# Patient Record
Sex: MALE | Race: OTHER | NOT HISPANIC OR LATINO
[De-identification: names, ages, dates, MRNs, and addresses within clinical notes are randomized per-mention and may not be internally consistent; named-entity substitution may affect disease eponyms.]

---

## 2019-04-10 ENCOUNTER — APPOINTMENT (OUTPATIENT)
Dept: DERMATOLOGY | Facility: CLINIC | Age: 44
End: 2019-04-10
Payer: COMMERCIAL

## 2019-04-10 VITALS — BODY MASS INDEX: 25.75 KG/M2 | SYSTOLIC BLOOD PRESSURE: 110 MMHG | DIASTOLIC BLOOD PRESSURE: 72 MMHG | WEIGHT: 150 LBS

## 2019-04-10 DIAGNOSIS — L30.0 NUMMULAR DERMATITIS: ICD-10-CM

## 2019-04-10 DIAGNOSIS — D22.9 MELANOCYTIC NEVI, UNSPECIFIED: ICD-10-CM

## 2019-04-10 DIAGNOSIS — L85.3 XEROSIS CUTIS: ICD-10-CM

## 2019-04-10 DIAGNOSIS — Z12.83 ENCOUNTER FOR SCREENING FOR MALIGNANT NEOPLASM OF SKIN: ICD-10-CM

## 2019-04-10 PROCEDURE — 99203 OFFICE O/P NEW LOW 30 MIN: CPT

## 2019-04-10 NOTE — ASSESSMENT
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

## 2019-04-10 NOTE — PHYSICAL EXAM
[FreeTextEntry3] : AAOx3, pleasant, NAD, no visual lymphadenopathy\par hair, scalp, face, nose, eyelids, ears, lips, oropharynx, neck, chest, abdomen, back, right arm, left arm, nails, and hands examined with all normal findings,\par pertinent findings include:\par \par multiple benign nevi and lentigines\par xerosis\par lichenified circular plaques on 25% BSA

## 2019-06-19 ENCOUNTER — APPOINTMENT (OUTPATIENT)
Dept: GASTROENTEROLOGY | Facility: CLINIC | Age: 44
End: 2019-06-19
Payer: COMMERCIAL

## 2019-06-19 VITALS
BODY MASS INDEX: 25.61 KG/M2 | SYSTOLIC BLOOD PRESSURE: 120 MMHG | HEART RATE: 68 BPM | OXYGEN SATURATION: 99 % | HEIGHT: 64 IN | WEIGHT: 150 LBS | TEMPERATURE: 98.8 F | DIASTOLIC BLOOD PRESSURE: 70 MMHG

## 2019-06-19 DIAGNOSIS — Z82.49 FAMILY HISTORY OF ISCHEMIC HEART DISEASE AND OTHER DISEASES OF THE CIRCULATORY SYSTEM: ICD-10-CM

## 2019-06-19 DIAGNOSIS — K59.09 OTHER CONSTIPATION: ICD-10-CM

## 2019-06-19 DIAGNOSIS — D12.6 BENIGN NEOPLASM OF COLON, UNSPECIFIED: ICD-10-CM

## 2019-06-19 DIAGNOSIS — K82.4 CHOLESTEROLOSIS OF GALLBLADDER: ICD-10-CM

## 2019-06-19 DIAGNOSIS — K92.1 MELENA: ICD-10-CM

## 2019-06-19 PROCEDURE — 99204 OFFICE O/P NEW MOD 45 MIN: CPT

## 2019-06-19 RX ORDER — TRIAMCINOLONE ACETONIDE 1 MG/G
0.1 OINTMENT TOPICAL TWICE DAILY
Qty: 1 | Refills: 3 | Status: DISCONTINUED | COMMUNITY
Start: 2019-04-10 | End: 2019-06-19

## 2019-06-19 NOTE — ASSESSMENT
[FreeTextEntry1] : Patient with history of tubular adenomas of the colon. He will be scheduled for colonoscopy.\par \par The patient has no upper gastrointestinal symptoms but had H. pylori gastritis treated several years ago. H. pylori breath test will be ordered.\par \par Patient had an ultrasound that revealed several small benign appearing gallbladder polyps. A followup ultrasound will be ordered\par

## 2019-06-19 NOTE — PHYSICAL EXAM
[Sclera] : the sclera and conjunctiva were normal [General Appearance - In No Acute Distress] : in no acute distress [General Appearance - Alert] : alert [Outer Ear] : the ears and nose were normal in appearance [PERRL With Normal Accommodation] : pupils were equal in size, round, and reactive to light [Extraocular Movements] : extraocular movements were intact [Neck Appearance] : the appearance of the neck was normal [Oropharynx] : the oropharynx was normal [Neck Cervical Mass (___cm)] : no neck mass was observed [Jugular Venous Distention Increased] : there was no jugular-venous distention [Thyroid Nodule] : there were no palpable thyroid nodules [Thyroid Diffuse Enlargement] : the thyroid was not enlarged [Heart Rate And Rhythm] : heart rate was normal and rhythm regular [Auscultation Breath Sounds / Voice Sounds] : lungs were clear to auscultation bilaterally [Murmurs] : no murmurs [Heart Sounds Gallop] : no gallops [Heart Sounds] : normal S1 and S2 [Heart Sounds Pericardial Friction Rub] : no pericardial rub [Bowel Sounds] : normal bowel sounds [Abdomen Soft] : soft [] : no hepato-splenomegaly [Abdomen Tenderness] : non-tender [No Spinal Tenderness] : no spinal tenderness [No CVA Tenderness] : no ~M costovertebral angle tenderness [Abdomen Mass (___ Cm)] : no abdominal mass palpated [Nail Clubbing] : no clubbing  or cyanosis of the fingernails [Abnormal Walk] : normal gait [Musculoskeletal - Swelling] : no joint swelling seen [Oriented To Time, Place, And Person] : oriented to person, place, and time [Motor Tone] : muscle strength and tone were normal [Impaired Insight] : insight and judgment were intact [Affect] : the affect was normal

## 2019-06-19 NOTE — HISTORY OF PRESENT ILLNESS
[FreeTextEntry1] : Patient is a 43-year-old gentleman presents for a screening colonoscopy. He had a colonoscopy 3 years ago and had several tubular adenomas were removed. He does have some constipation and moves his bowels every 2-3 days. When the stool was hard he does notice some bright red blood on the stool, on the paper, and in the bowl.\par The patient has no upper gastrointestinal symptoms such as heartburn. He was treated for H. pylori several years ago.\par Patient also has a history of benign appearing gallbladder polyps. His list sonogram was several years ago there

## 2019-07-18 ENCOUNTER — APPOINTMENT (OUTPATIENT)
Dept: GASTROENTEROLOGY | Facility: AMBULATORY MEDICAL SERVICES | Age: 44
End: 2019-07-18
Payer: COMMERCIAL

## 2019-07-18 PROCEDURE — 45378 DIAGNOSTIC COLONOSCOPY: CPT

## 2019-08-09 ENCOUNTER — FORM ENCOUNTER (OUTPATIENT)
Age: 44
End: 2019-08-09

## 2019-08-10 ENCOUNTER — APPOINTMENT (OUTPATIENT)
Dept: ULTRASOUND IMAGING | Facility: CLINIC | Age: 44
End: 2019-08-10
Payer: COMMERCIAL

## 2019-08-10 ENCOUNTER — OUTPATIENT (OUTPATIENT)
Dept: OUTPATIENT SERVICES | Facility: HOSPITAL | Age: 44
LOS: 1 days | End: 2019-08-10

## 2019-08-10 ENCOUNTER — OUTPATIENT (OUTPATIENT)
Dept: OUTPATIENT SERVICES | Facility: HOSPITAL | Age: 44
LOS: 1 days | End: 2019-08-10
Payer: COMMERCIAL

## 2019-08-10 DIAGNOSIS — K82.4 CHOLESTEROLOSIS OF GALLBLADDER: ICD-10-CM

## 2019-08-10 DIAGNOSIS — Z00.8 ENCOUNTER FOR OTHER GENERAL EXAMINATION: ICD-10-CM

## 2019-08-10 PROCEDURE — 76700 US EXAM ABDOM COMPLETE: CPT

## 2019-08-10 PROCEDURE — 76700 US EXAM ABDOM COMPLETE: CPT | Mod: 26

## 2019-12-24 ENCOUNTER — NON-APPOINTMENT (OUTPATIENT)
Age: 44
End: 2019-12-24

## 2019-12-24 ENCOUNTER — APPOINTMENT (OUTPATIENT)
Dept: OPHTHALMOLOGY | Facility: CLINIC | Age: 44
End: 2019-12-24
Payer: COMMERCIAL

## 2019-12-24 PROCEDURE — 92004 COMPRE OPH EXAM NEW PT 1/>: CPT

## 2020-04-25 ENCOUNTER — MESSAGE (OUTPATIENT)
Age: 45
End: 2020-04-25

## 2020-05-03 LAB
SARS-COV-2 IGG SERPL IA-ACNC: 0 INDEX
SARS-COV-2 IGG SERPL QL IA: NEGATIVE

## 2022-07-18 ENCOUNTER — NON-APPOINTMENT (OUTPATIENT)
Age: 47
End: 2022-07-18

## 2022-07-19 ENCOUNTER — OUTPATIENT (OUTPATIENT)
Dept: OUTPATIENT SERVICES | Facility: HOSPITAL | Age: 47
LOS: 1 days | End: 2022-07-19
Payer: COMMERCIAL

## 2022-07-19 ENCOUNTER — APPOINTMENT (OUTPATIENT)
Dept: CT IMAGING | Facility: HOSPITAL | Age: 47
End: 2022-07-19

## 2022-07-19 DIAGNOSIS — R10.84 GENERALIZED ABDOMINAL PAIN: ICD-10-CM

## 2022-07-19 PROCEDURE — 74160 CT ABDOMEN W/CONTRAST: CPT | Mod: 26

## 2022-07-19 PROCEDURE — 74160 CT ABDOMEN W/CONTRAST: CPT

## 2022-08-01 ENCOUNTER — NON-APPOINTMENT (OUTPATIENT)
Age: 47
End: 2022-08-01

## 2022-08-01 ENCOUNTER — APPOINTMENT (OUTPATIENT)
Dept: CARDIOLOGY | Facility: CLINIC | Age: 47
End: 2022-08-01

## 2022-08-01 VITALS
HEIGHT: 64 IN | BODY MASS INDEX: 26.4 KG/M2 | SYSTOLIC BLOOD PRESSURE: 118 MMHG | WEIGHT: 154.6 LBS | DIASTOLIC BLOOD PRESSURE: 70 MMHG | HEART RATE: 74 BPM | OXYGEN SATURATION: 97 %

## 2022-08-01 VITALS — DIASTOLIC BLOOD PRESSURE: 70 MMHG | SYSTOLIC BLOOD PRESSURE: 100 MMHG

## 2022-08-01 PROCEDURE — 93000 ELECTROCARDIOGRAM COMPLETE: CPT

## 2022-08-01 PROCEDURE — 99204 OFFICE O/P NEW MOD 45 MIN: CPT

## 2022-08-01 RX ORDER — DOCUSATE SODIUM 100 MG/1
100 CAPSULE ORAL
Qty: 60 | Refills: 1 | Status: DISCONTINUED | OUTPATIENT
Start: 2019-06-19 | End: 2022-08-01

## 2022-08-01 RX ORDER — SODIUM SULFATE, POTASSIUM SULFATE, MAGNESIUM SULFATE 17.5; 3.13; 1.6 G/ML; G/ML; G/ML
17.5-3.13-1.6 SOLUTION, CONCENTRATE ORAL
Qty: 1 | Refills: 0 | Status: DISCONTINUED | COMMUNITY
Start: 2019-06-19 | End: 2022-08-01

## 2022-08-01 RX ORDER — WHEAT DEXTRIN 3 G/4 G
POWDER (GRAM) ORAL
Qty: 1 | Refills: 0 | Status: DISCONTINUED | OUTPATIENT
Start: 2019-06-19 | End: 2022-08-01

## 2022-08-01 NOTE — HISTORY OF PRESENT ILLNESS
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  Since that time she reports being short of breath after running for a minute with some associated epigastric/lower sternal chest pressure that resolves over the next 5 to 10 minutes.  No other associated symptoms are reported.\par He does not have any chest pain or shortness of breath at rest.\par He has no history of coronary artery disease, diabetes mellitus, stroke, congestive heart failure or renal insufficiency.\par He does have some occasional orthostatic symptoms and reports that he quit smoking 10 years ago, but only smoked for about 10 years.\par He has elevated triglycerides in the past but normal LDL.\tony Has a history of fatty liver and has been treated in the past for expiratory infection.  He is followed by GI for colon polyp as well.\par Family history is notable for his mother's cousin who had premature coronary disease in his 40s and sudden death.\par

## 2022-08-01 NOTE — DISCUSSION/SUMMARY
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 who has a history of high triglycerides and is a former smoker.\par His ECG shows normal sinus rhythm with early repolarization, this is likely a normal variant, though I have no previous ECG with which to compare to.\par We reviewed the possibilities for symptoms including coronary artery disease or deconditioning with valvular disease or pericardial effusion much less likely.\par I have scheduled an echocardiogram to exclude any structural heart disease for his exertional dyspnea and, if no abnormalities are seen an exercise stress test would be appropriate to exclude serious ischemia, known this is unlikely.\par I referred him to an internist for general medical therapy.

## 2022-09-07 ENCOUNTER — APPOINTMENT (OUTPATIENT)
Dept: CARDIOLOGY | Facility: CLINIC | Age: 47
End: 2022-09-07

## 2022-09-07 DIAGNOSIS — R07.9 CHEST PAIN, UNSPECIFIED: ICD-10-CM

## 2022-09-07 PROCEDURE — 93015 CV STRESS TEST SUPVJ I&R: CPT

## 2022-09-07 PROCEDURE — 93306 TTE W/DOPPLER COMPLETE: CPT

## 2022-11-21 ENCOUNTER — NON-APPOINTMENT (OUTPATIENT)
Age: 47
End: 2022-11-21

## 2022-11-21 ENCOUNTER — APPOINTMENT (OUTPATIENT)
Dept: INTERNAL MEDICINE | Facility: CLINIC | Age: 47
End: 2022-11-21

## 2022-11-21 VITALS — BODY MASS INDEX: 25.95 KG/M2 | HEIGHT: 64 IN | WEIGHT: 152 LBS

## 2022-11-21 VITALS — SYSTOLIC BLOOD PRESSURE: 122 MMHG | HEART RATE: 72 BPM | DIASTOLIC BLOOD PRESSURE: 76 MMHG | RESPIRATION RATE: 14 BRPM

## 2022-11-21 DIAGNOSIS — B96.81 GASTRITIS, UNSPECIFIED, W/OUT BLEEDING: ICD-10-CM

## 2022-11-21 DIAGNOSIS — Z83.79 FAMILY HISTORY OF OTHER DISEASES OF THE DIGESTIVE SYSTEM: ICD-10-CM

## 2022-11-21 DIAGNOSIS — K29.70 GASTRITIS, UNSPECIFIED, W/OUT BLEEDING: ICD-10-CM

## 2022-11-21 DIAGNOSIS — Z87.891 PERSONAL HISTORY OF NICOTINE DEPENDENCE: ICD-10-CM

## 2022-11-21 DIAGNOSIS — E78.1 PURE HYPERGLYCERIDEMIA: ICD-10-CM

## 2022-11-21 DIAGNOSIS — R06.00 DYSPNEA, UNSPECIFIED: ICD-10-CM

## 2022-11-21 DIAGNOSIS — Z83.438 FAMILY HISTORY OF OTHER DISORDER OF LIPOPROTEIN METABOLISM AND OTHER LIPIDEMIA: ICD-10-CM

## 2022-11-21 PROCEDURE — 99203 OFFICE O/P NEW LOW 30 MIN: CPT | Mod: 25

## 2022-11-21 PROCEDURE — 93000 ELECTROCARDIOGRAM COMPLETE: CPT | Mod: 59

## 2022-11-21 PROCEDURE — G0444 DEPRESSION SCREEN ANNUAL: CPT | Mod: 59

## 2022-11-21 PROCEDURE — 99386 PREV VISIT NEW AGE 40-64: CPT | Mod: 25

## 2022-11-21 PROCEDURE — 36415 COLL VENOUS BLD VENIPUNCTURE: CPT

## 2022-11-27 NOTE — HISTORY OF PRESENT ILLNESS
[FreeTextEntry1] : MERCY WELLINGTON is a 47 year old male  presents for an annual physical. Patient is also here for f/u of chronic medical conditions, which have been stable on medications. These include fatty liver. [de-identified] : Here to establish care

## 2022-11-27 NOTE — ASSESSMENT
[FreeTextEntry1] : Blood work was drawn and sent to the lab today. The patient has been instructed to call the office next week to discuss today's lab work.\par \par Abdominal US to further evaluate fatty liver\par Consider GI evaluation and fibroscan\par \par Routine health counselling provided\par \par \par Annual CPE\par \par \par

## 2022-11-27 NOTE — HEALTH RISK ASSESSMENT
[Very Good] : ~his/her~  mood as very good [Former] : Former [No] : No [0] : 2) Feeling down, depressed, or hopeless: Not at all (0) [PHQ-2 Negative - No further assessment needed] : PHQ-2 Negative - No further assessment needed [[id number]] : 0

## 2022-12-05 LAB
25(OH)D3 SERPL-MCNC: 16.8 NG/ML
ALBUMIN SERPL ELPH-MCNC: 4.9 G/DL
ALP BLD-CCNC: 83 U/L
ALT SERPL-CCNC: 24 U/L
ANION GAP SERPL CALC-SCNC: 13 MMOL/L
APPEARANCE: CLEAR
AST SERPL-CCNC: 24 U/L
BASOPHILS # BLD AUTO: 0.06 K/UL
BASOPHILS NFR BLD AUTO: 0.5 %
BILIRUB SERPL-MCNC: 0.4 MG/DL
BILIRUBIN URINE: NEGATIVE
BLOOD URINE: NEGATIVE
BUN SERPL-MCNC: 18 MG/DL
CALCIUM SERPL-MCNC: 9.9 MG/DL
CHLORIDE SERPL-SCNC: 100 MMOL/L
CHOLEST SERPL-MCNC: 226 MG/DL
CO2 SERPL-SCNC: 25 MMOL/L
COLOR: NORMAL
CREAT SERPL-MCNC: 0.97 MG/DL
EGFR: 96 ML/MIN/1.73M2
EOSINOPHIL # BLD AUTO: 0.15 K/UL
EOSINOPHIL NFR BLD AUTO: 1.3 %
FOLATE SERPL-MCNC: 8.7 NG/ML
GLUCOSE QUALITATIVE U: NEGATIVE
GLUCOSE SERPL-MCNC: 89 MG/DL
HCT VFR BLD CALC: 43.8 %
HDLC SERPL-MCNC: 46 MG/DL
HGB BLD-MCNC: 14.5 G/DL
IMM GRANULOCYTES NFR BLD AUTO: 0.5 %
KETONES URINE: NEGATIVE
LDLC SERPL CALC-MCNC: 108 MG/DL
LEUKOCYTE ESTERASE URINE: NEGATIVE
LYMPHOCYTES # BLD AUTO: 1.83 K/UL
LYMPHOCYTES NFR BLD AUTO: 15.8 %
MAGNESIUM SERPL-MCNC: 2.1 MG/DL
MAN DIFF?: NORMAL
MCHC RBC-ENTMCNC: 31.8 PG
MCHC RBC-ENTMCNC: 33.1 GM/DL
MCV RBC AUTO: 96.1 FL
MONOCYTES # BLD AUTO: 0.71 K/UL
MONOCYTES NFR BLD AUTO: 6.1 %
NEUTROPHILS # BLD AUTO: 8.75 K/UL
NEUTROPHILS NFR BLD AUTO: 75.8 %
NITRITE URINE: NEGATIVE
NONHDLC SERPL-MCNC: 181 MG/DL
PH URINE: 7
PLATELET # BLD AUTO: 382 K/UL
POTASSIUM SERPL-SCNC: 4.3 MMOL/L
PROT SERPL-MCNC: 7.7 G/DL
PROTEIN URINE: NORMAL
RBC # BLD: 4.56 M/UL
RBC # FLD: 15 %
SODIUM SERPL-SCNC: 138 MMOL/L
SPECIFIC GRAVITY URINE: 1.02
T4 FREE SERPL-MCNC: 1.2 NG/DL
T4 SERPL-MCNC: 7.4 UG/DL
TRIGL SERPL-MCNC: 361 MG/DL
TSH SERPL-ACNC: 1.34 UIU/ML
URATE SERPL-MCNC: 5.9 MG/DL
UROBILINOGEN URINE: NORMAL
VIT B12 SERPL-MCNC: 394 PG/ML
WBC # FLD AUTO: 11.56 K/UL

## 2023-01-03 ENCOUNTER — APPOINTMENT (OUTPATIENT)
Dept: CARDIOLOGY | Facility: CLINIC | Age: 48
End: 2023-01-03

## 2023-08-22 ENCOUNTER — NON-APPOINTMENT (OUTPATIENT)
Age: 48
End: 2023-08-22

## 2023-08-28 ENCOUNTER — NON-APPOINTMENT (OUTPATIENT)
Age: 48
End: 2023-08-28

## 2023-11-09 NOTE — HISTORY OF PRESENT ILLNESS
unable to perform [de-identified] : patient here with nummular eczema. has 25% BSA with lichenified plaques. has been using triamcinolone with good response. interested in exploring other treatments.\par \par also would like skin check [FreeTextEntry1] : eczema, skin check

## 2023-11-29 ENCOUNTER — APPOINTMENT (OUTPATIENT)
Dept: COLORECTAL SURGERY | Facility: CLINIC | Age: 48
End: 2023-11-29
Payer: COMMERCIAL

## 2023-11-29 VITALS
HEIGHT: 64 IN | WEIGHT: 152 LBS | DIASTOLIC BLOOD PRESSURE: 76 MMHG | BODY MASS INDEX: 25.95 KG/M2 | SYSTOLIC BLOOD PRESSURE: 128 MMHG | TEMPERATURE: 97.8 F | HEART RATE: 62 BPM | RESPIRATION RATE: 12 BRPM

## 2023-11-29 DIAGNOSIS — K62.5 HEMORRHAGE OF ANUS AND RECTUM: ICD-10-CM

## 2023-11-29 DIAGNOSIS — K60.2 ANAL FISSURE, UNSPECIFIED: ICD-10-CM

## 2023-11-29 DIAGNOSIS — Z86.39 PERSONAL HISTORY OF OTHER ENDOCRINE, NUTRITIONAL AND METABOLIC DISEASE: ICD-10-CM

## 2023-11-29 PROCEDURE — 45300 PROCTOSIGMOIDOSCOPY DX: CPT

## 2023-11-29 PROCEDURE — 99202 OFFICE O/P NEW SF 15 MIN: CPT | Mod: 25

## 2023-11-29 RX ORDER — ROSUVASTATIN CALCIUM 10 MG/1
10 TABLET, FILM COATED ORAL
Refills: 0 | Status: ACTIVE | COMMUNITY

## 2023-11-29 RX ORDER — HYDROCORTISONE ACETATE 25 MG/1
25 SUPPOSITORY RECTAL
Qty: 1 | Refills: 2 | Status: ACTIVE | COMMUNITY
Start: 2023-11-29 | End: 1900-01-01

## 2023-12-03 ENCOUNTER — EMERGENCY (EMERGENCY)
Facility: HOSPITAL | Age: 48
LOS: 1 days | Discharge: HOME | End: 2023-12-03
Attending: EMERGENCY MEDICINE
Payer: COMMERCIAL

## 2023-12-03 VITALS
SYSTOLIC BLOOD PRESSURE: 132 MMHG | TEMPERATURE: 98 F | HEART RATE: 77 BPM | WEIGHT: 151.9 LBS | DIASTOLIC BLOOD PRESSURE: 84 MMHG | HEIGHT: 64 IN | RESPIRATION RATE: 19 BRPM | OXYGEN SATURATION: 99 %

## 2023-12-03 LAB
ALBUMIN SERPL ELPH-MCNC: 4.6 G/DL — SIGNIFICANT CHANGE UP (ref 3.3–5)
ALBUMIN SERPL ELPH-MCNC: 4.6 G/DL — SIGNIFICANT CHANGE UP (ref 3.3–5)
ALP SERPL-CCNC: 98 U/L — SIGNIFICANT CHANGE UP (ref 40–120)
ALP SERPL-CCNC: 98 U/L — SIGNIFICANT CHANGE UP (ref 40–120)
ALT FLD-CCNC: 39 U/L — SIGNIFICANT CHANGE UP (ref 10–45)
ALT FLD-CCNC: 39 U/L — SIGNIFICANT CHANGE UP (ref 10–45)
ANION GAP SERPL CALC-SCNC: 10 MMOL/L — SIGNIFICANT CHANGE UP (ref 5–17)
ANION GAP SERPL CALC-SCNC: 10 MMOL/L — SIGNIFICANT CHANGE UP (ref 5–17)
AST SERPL-CCNC: 31 U/L — SIGNIFICANT CHANGE UP (ref 10–40)
AST SERPL-CCNC: 31 U/L — SIGNIFICANT CHANGE UP (ref 10–40)
BASOPHILS # BLD AUTO: 0.08 K/UL — SIGNIFICANT CHANGE UP (ref 0–0.2)
BASOPHILS # BLD AUTO: 0.08 K/UL — SIGNIFICANT CHANGE UP (ref 0–0.2)
BASOPHILS NFR BLD AUTO: 0.5 % — SIGNIFICANT CHANGE UP (ref 0–2)
BASOPHILS NFR BLD AUTO: 0.5 % — SIGNIFICANT CHANGE UP (ref 0–2)
BILIRUB SERPL-MCNC: 0.3 MG/DL — SIGNIFICANT CHANGE UP (ref 0.2–1.2)
BILIRUB SERPL-MCNC: 0.3 MG/DL — SIGNIFICANT CHANGE UP (ref 0.2–1.2)
BUN SERPL-MCNC: 15 MG/DL — SIGNIFICANT CHANGE UP (ref 7–23)
BUN SERPL-MCNC: 15 MG/DL — SIGNIFICANT CHANGE UP (ref 7–23)
CALCIUM SERPL-MCNC: 9.4 MG/DL — SIGNIFICANT CHANGE UP (ref 8.4–10.5)
CALCIUM SERPL-MCNC: 9.4 MG/DL — SIGNIFICANT CHANGE UP (ref 8.4–10.5)
CHLORIDE SERPL-SCNC: 105 MMOL/L — SIGNIFICANT CHANGE UP (ref 96–108)
CHLORIDE SERPL-SCNC: 105 MMOL/L — SIGNIFICANT CHANGE UP (ref 96–108)
CO2 SERPL-SCNC: 24 MMOL/L — SIGNIFICANT CHANGE UP (ref 22–31)
CO2 SERPL-SCNC: 24 MMOL/L — SIGNIFICANT CHANGE UP (ref 22–31)
CREAT SERPL-MCNC: 0.99 MG/DL — SIGNIFICANT CHANGE UP (ref 0.5–1.3)
CREAT SERPL-MCNC: 0.99 MG/DL — SIGNIFICANT CHANGE UP (ref 0.5–1.3)
EGFR: 93 ML/MIN/1.73M2 — SIGNIFICANT CHANGE UP
EGFR: 93 ML/MIN/1.73M2 — SIGNIFICANT CHANGE UP
EOSINOPHIL # BLD AUTO: 0.28 K/UL — SIGNIFICANT CHANGE UP (ref 0–0.5)
EOSINOPHIL # BLD AUTO: 0.28 K/UL — SIGNIFICANT CHANGE UP (ref 0–0.5)
EOSINOPHIL NFR BLD AUTO: 1.8 % — SIGNIFICANT CHANGE UP (ref 0–6)
EOSINOPHIL NFR BLD AUTO: 1.8 % — SIGNIFICANT CHANGE UP (ref 0–6)
FLUAV AG NPH QL: SIGNIFICANT CHANGE UP
FLUAV AG NPH QL: SIGNIFICANT CHANGE UP
FLUBV AG NPH QL: SIGNIFICANT CHANGE UP
FLUBV AG NPH QL: SIGNIFICANT CHANGE UP
GLUCOSE SERPL-MCNC: 97 MG/DL — SIGNIFICANT CHANGE UP (ref 70–99)
GLUCOSE SERPL-MCNC: 97 MG/DL — SIGNIFICANT CHANGE UP (ref 70–99)
HCT VFR BLD CALC: 41.6 % — SIGNIFICANT CHANGE UP (ref 39–50)
HCT VFR BLD CALC: 41.6 % — SIGNIFICANT CHANGE UP (ref 39–50)
HGB BLD-MCNC: 14.1 G/DL — SIGNIFICANT CHANGE UP (ref 13–17)
HGB BLD-MCNC: 14.1 G/DL — SIGNIFICANT CHANGE UP (ref 13–17)
IMM GRANULOCYTES NFR BLD AUTO: 0.6 % — SIGNIFICANT CHANGE UP (ref 0–0.9)
IMM GRANULOCYTES NFR BLD AUTO: 0.6 % — SIGNIFICANT CHANGE UP (ref 0–0.9)
LYMPHOCYTES # BLD AUTO: 1.75 K/UL — SIGNIFICANT CHANGE UP (ref 1–3.3)
LYMPHOCYTES # BLD AUTO: 1.75 K/UL — SIGNIFICANT CHANGE UP (ref 1–3.3)
LYMPHOCYTES # BLD AUTO: 11.1 % — LOW (ref 13–44)
LYMPHOCYTES # BLD AUTO: 11.1 % — LOW (ref 13–44)
MCHC RBC-ENTMCNC: 31.9 PG — SIGNIFICANT CHANGE UP (ref 27–34)
MCHC RBC-ENTMCNC: 31.9 PG — SIGNIFICANT CHANGE UP (ref 27–34)
MCHC RBC-ENTMCNC: 33.9 GM/DL — SIGNIFICANT CHANGE UP (ref 32–36)
MCHC RBC-ENTMCNC: 33.9 GM/DL — SIGNIFICANT CHANGE UP (ref 32–36)
MCV RBC AUTO: 94.1 FL — SIGNIFICANT CHANGE UP (ref 80–100)
MCV RBC AUTO: 94.1 FL — SIGNIFICANT CHANGE UP (ref 80–100)
MONOCYTES # BLD AUTO: 0.73 K/UL — SIGNIFICANT CHANGE UP (ref 0–0.9)
MONOCYTES # BLD AUTO: 0.73 K/UL — SIGNIFICANT CHANGE UP (ref 0–0.9)
MONOCYTES NFR BLD AUTO: 4.6 % — SIGNIFICANT CHANGE UP (ref 2–14)
MONOCYTES NFR BLD AUTO: 4.6 % — SIGNIFICANT CHANGE UP (ref 2–14)
NEUTROPHILS # BLD AUTO: 12.76 K/UL — HIGH (ref 1.8–7.4)
NEUTROPHILS # BLD AUTO: 12.76 K/UL — HIGH (ref 1.8–7.4)
NEUTROPHILS NFR BLD AUTO: 81.4 % — HIGH (ref 43–77)
NEUTROPHILS NFR BLD AUTO: 81.4 % — HIGH (ref 43–77)
NRBC # BLD: 0 /100 WBCS — SIGNIFICANT CHANGE UP (ref 0–0)
NRBC # BLD: 0 /100 WBCS — SIGNIFICANT CHANGE UP (ref 0–0)
PLATELET # BLD AUTO: 383 K/UL — SIGNIFICANT CHANGE UP (ref 150–400)
PLATELET # BLD AUTO: 383 K/UL — SIGNIFICANT CHANGE UP (ref 150–400)
POTASSIUM SERPL-MCNC: 4.6 MMOL/L — SIGNIFICANT CHANGE UP (ref 3.5–5.3)
POTASSIUM SERPL-MCNC: 4.6 MMOL/L — SIGNIFICANT CHANGE UP (ref 3.5–5.3)
POTASSIUM SERPL-SCNC: 4.6 MMOL/L — SIGNIFICANT CHANGE UP (ref 3.5–5.3)
POTASSIUM SERPL-SCNC: 4.6 MMOL/L — SIGNIFICANT CHANGE UP (ref 3.5–5.3)
PROT SERPL-MCNC: 7.6 G/DL — SIGNIFICANT CHANGE UP (ref 6–8.3)
PROT SERPL-MCNC: 7.6 G/DL — SIGNIFICANT CHANGE UP (ref 6–8.3)
RBC # BLD: 4.42 M/UL — SIGNIFICANT CHANGE UP (ref 4.2–5.8)
RBC # BLD: 4.42 M/UL — SIGNIFICANT CHANGE UP (ref 4.2–5.8)
RBC # FLD: 14.8 % — HIGH (ref 10.3–14.5)
RBC # FLD: 14.8 % — HIGH (ref 10.3–14.5)
RSV RNA NPH QL NAA+NON-PROBE: SIGNIFICANT CHANGE UP
RSV RNA NPH QL NAA+NON-PROBE: SIGNIFICANT CHANGE UP
S PYO AG SPEC QL IA: NEGATIVE — SIGNIFICANT CHANGE UP
S PYO AG SPEC QL IA: NEGATIVE — SIGNIFICANT CHANGE UP
SARS-COV-2 RNA SPEC QL NAA+PROBE: SIGNIFICANT CHANGE UP
SARS-COV-2 RNA SPEC QL NAA+PROBE: SIGNIFICANT CHANGE UP
SODIUM SERPL-SCNC: 139 MMOL/L — SIGNIFICANT CHANGE UP (ref 135–145)
SODIUM SERPL-SCNC: 139 MMOL/L — SIGNIFICANT CHANGE UP (ref 135–145)
WBC # BLD: 15.7 K/UL — HIGH (ref 3.8–10.5)
WBC # BLD: 15.7 K/UL — HIGH (ref 3.8–10.5)
WBC # FLD AUTO: 15.7 K/UL — HIGH (ref 3.8–10.5)
WBC # FLD AUTO: 15.7 K/UL — HIGH (ref 3.8–10.5)

## 2023-12-03 PROCEDURE — 99285 EMERGENCY DEPT VISIT HI MDM: CPT

## 2023-12-03 PROCEDURE — 70491 CT SOFT TISSUE NECK W/DYE: CPT | Mod: 26,MA

## 2023-12-03 RX ORDER — KETOROLAC TROMETHAMINE 30 MG/ML
15 SYRINGE (ML) INJECTION ONCE
Refills: 0 | Status: DISCONTINUED | OUTPATIENT
Start: 2023-12-03 | End: 2023-12-03

## 2023-12-03 RX ORDER — AMPICILLIN SODIUM AND SULBACTAM SODIUM 250; 125 MG/ML; MG/ML
3 INJECTION, POWDER, FOR SUSPENSION INTRAMUSCULAR; INTRAVENOUS ONCE
Refills: 0 | Status: COMPLETED | OUTPATIENT
Start: 2023-12-03 | End: 2023-12-03

## 2023-12-03 RX ORDER — DEXAMETHASONE 0.5 MG/5ML
10 ELIXIR ORAL ONCE
Refills: 0 | Status: COMPLETED | OUTPATIENT
Start: 2023-12-03 | End: 2023-12-03

## 2023-12-03 RX ORDER — SODIUM CHLORIDE 9 MG/ML
1000 INJECTION, SOLUTION INTRAVENOUS ONCE
Refills: 0 | Status: COMPLETED | OUTPATIENT
Start: 2023-12-03 | End: 2023-12-03

## 2023-12-03 RX ADMIN — Medication 15 MILLIGRAM(S): at 21:29

## 2023-12-03 RX ADMIN — Medication 15 MILLIGRAM(S): at 22:00

## 2023-12-03 RX ADMIN — AMPICILLIN SODIUM AND SULBACTAM SODIUM 200 GRAM(S): 250; 125 INJECTION, POWDER, FOR SUSPENSION INTRAMUSCULAR; INTRAVENOUS at 23:32

## 2023-12-03 RX ADMIN — Medication 10 MILLIGRAM(S): at 21:36

## 2023-12-03 NOTE — ED PROVIDER NOTE - NSFOLLOWUPCLINICS_GEN_ALL_ED_FT
[facility] - ENT  Otolaryngology (ENT)  [address] [place] [address]  Phone: [phone]  Fax:   Follow Up Time: Routine     [facility] - ENT  Otolaryngology (ENT)  [address] [place] [address]  Phone: [phone]  Fax:   Follow Up Time: Routine

## 2023-12-03 NOTE — ED PROVIDER NOTE - OBJECTIVE STATEMENT
47yo M w/ no pmh presenting with throat pain. Pt had URI 4w ago, with cough, sore throat. All symptoms improved however sore throat persisted. Has R throat pain, worse with swallowing. Daughter has URI currently. No fever, cp, sob, abd pain, n/v. Was on amoxicillin finished course recently. 47yo M w/ no pmh presenting with throat pain. Pt had URI 4w ago, with cough, sore throat. All symptoms improved however sore throat persisted. Has R throat pain, worse with swallowing. Worsened yesterday. Daughter has URI currently which started yesterday. No fever, cp, sob, abd pain, n/v. Took some leftover amoxicillin Thurs, Fri, today, total 6 doses.

## 2023-12-03 NOTE — ED PROVIDER NOTE - PROGRESS NOTE DETAILS
Attending Dr. Pardo: Patient signed out to me pending CT to r/o PTA. CT without acute pathology. Patient reassessed, pain is significantly improved. Discussed results including elevated WBC. Patient would prefer DC on oral antibiotics but would consider CDU if available. Spoke with CDU PA, currently full but possible virtual CDU. Will follow up. Attending Dr. Pardo: No beds available in CDU. Patient requesting discharge and will follow up with his PMD. Augmentin sent to pharmacy. Instructed on proper use (complete course, not just used for pain). Patient tolerated PO intake.

## 2023-12-03 NOTE — ED PROVIDER NOTE - PHYSICAL EXAMINATION
General appearance: NAD, conversant, afebrile    Eyes: anicteric sclerae, KAREN, EOMI   HENT: oropharynx clear, MMM and no ulcerations, no pharyngeal erythema or exudate; no trismus   Neck: no swelling or ttp. Trachea midline; Full range of motion, supple   Pulm: CTA bl, normal respiratory effort and no intercostal retractions, normal work of breathing   CV: RRR, No murmurs, rubs, or gallops.    Abdomen: Soft, non-tender, non-distended; no guarding or rebound   Extremities: No peripheral edema or extremity lymphadenopathy. 5/5 strength in all four extremities.   Skin: Dry, normal temperature, turgor and texture; no rash, ulcers or subcutaneous nodules   Psych: Appropriate affect, cooperative; alert and oriented to person, place and time General appearance: NAD, conversant, afebrile    Eyes: anicteric sclerae, KAREN, EOMI   HENT: mild R TM clear effusion, no pus. oropharynx clear, MMM and no ulcerations, no pharyngeal erythema or exudate; no trismus   Neck: no swelling or ttp. Trachea midline; Full range of motion, supple   Pulm: CTA bl, normal respiratory effort and no intercostal retractions, normal work of breathing   CV: RRR, No murmurs, rubs, or gallops.    Abdomen: Soft, non-tender, non-distended; no guarding or rebound   Extremities: No peripheral edema or extremity lymphadenopathy. 5/5 strength in all four extremities.   Skin: Dry, normal temperature, turgor and texture; no rash, ulcers or subcutaneous nodules   Psych: Appropriate affect, cooperative; alert and oriented to person, place and time

## 2023-12-03 NOTE — ED PROVIDER NOTE - NSFOLLOWUPINSTRUCTIONS_ED_ALL_ED_FT
1. You presented to the emergency department for: SORE THROAT.     Take the antibiotics as prescribed.     2. Your evaluation in the emergency department included a physician evaluation. Your work-up did not reveal any findings indicating the need for admission to the hospital or any emergent interventions at this time.     3. It is recommended that you follow-up with your primary care doctor.    If needed, to arrange an appointment with a primary care provider please call: [phone]    4. Please continue taking your regular medications as prescribed.     For pain you may take 400-600 mg IBUPROFEN or 500-1000mg ACETAMINOPHEN every 6-8 hours - as needed.  This is an over-the-counter medication - please read the instructions for use and warnings on the label. If you have any questions regarding its use, you may refer them to your local pharmacist.    5. PLEASE RETURN TO THE EMERGENCY DEPARTMENT IMMEDIATELY IF you develop any fevers not responding to over the counter medications, uncontrollable nausea and vomiting, an inability to tolerate eating and drinking, difficulty breathing, chest pain, a severe increase in your symptoms or pain, or any other new symptoms that concern you. 1. You presented to the emergency department for: SORE THROAT.     Take the antibiotics as prescribed.     2. Your evaluation in the emergency department included a physician evaluation. Your work-up did not reveal any findings indicating the need for admission to the hospital or any emergent interventions at this time.     3. It is recommended that you follow-up with your primary care doctor.    If needed, to arrange an appointment with a primary care provider please call: [phone]    4. Please continue taking your regular medications as prescribed.     For pain you may take 400-600 mg IBUPROFEN or 500-1000mg ACETAMINOPHEN every 6-8 hours - as needed.  This is an over-the-counter medication - please read the instructions for use and warnings on the label. If you have any questions regarding its use, you may refer them to your local pharmacist.    5. PLEASE RETURN TO THE EMERGENCY DEPARTMENT IMMEDIATELY IF you develop any fevers not responding to over the counter medications, uncontrollable nausea and vomiting, an inability to tolerate eating and drinking, difficulty breathing, chest pain, a severe increase in your symptoms or pain, or any other new symptoms that concern you.

## 2023-12-03 NOTE — ED ADULT TRIAGE NOTE - CHIEF COMPLAINT QUOTE
R side throat pain x weeks. Worsened today, when swallowing.   Took leftover augmentin for 3 days. Last dose this afternoon.

## 2023-12-03 NOTE — ED PROVIDER NOTE - PATIENT PORTAL LINK FT
You can access the FollowMyHealth Patient Portal offered by [facility] by registering at the following website: [email or web address]. By joining [employer]’s FollowMyHealth portal, you will also be able to view your health information using other applications (apps) compatible with our system. You can access the FollowMyHealth Patient Portal offered by [facility] by registering at the following website: [email or web address]. By joining [employer]’s FollowMyHealth portal, you will also be able to view your health information using other applications (apps) compatible with our system.

## 2023-12-03 NOTE — ED PROVIDER NOTE - CLINICAL SUMMARY MEDICAL DECISION MAKING FREE TEXT BOX
47yo M w/ no pmh presenting with throat pain. Pt had URI 4w ago, with cough, sore throat. All symptoms improved however sore throat persisted. Has R throat pain, worse with swallowing. Daughter has URI currently. No fever, cp, sob, abd pain, n/v. Was on amoxicillin finished course recently. Exam shows well appearing male, clear oropharynx, no exudates. No external swelling or ttp. No trismus. Low suspicion for RPA or PTA. Likely viral uri vs. infectious mono. Will get labs, ebv, symptomatic tx. 49yo M w/ no pmh presenting with throat pain. Pt had URI 4w ago, with cough, sore throat. All symptoms improved however sore throat persisted. Has R throat pain, worse with swallowing. Daughter has URI currently. No fever, cp, sob, abd pain, n/v. Was on amoxicillin finished course recently. Exam shows well appearing male, clear oropharynx, no exudates. No external swelling or ttp. No trismus. Low suspicion for RPA or PTA. Likely viral uri vs. infectious mono. Will get labs, ebv, symptomatic tx.

## 2023-12-03 NOTE — ED PROVIDER NOTE - ATTENDING CONTRIBUTION TO CARE
Attending MD Ratliff: I personally have seen and examined this patient.  Resident note reviewed and agree on plan of care and except where noted.  See below for details.     seen in [facility] [place] [facility]    48M with no reported contributory PMH/PSH/Meds, no known drug allergies presents to the ED with throat pain.  Reports developed throat pain about 4 weeks ago in conjunction with URI symptoms including cough, sore throat.  Reports all other symptoms resolved except for sore throat.  Reports feels worsening sore throat over last few days.  Reports now he feels as if he is "swallowing glass" each time he swallows and reports feels as if he is having difficulty swallowing, unable to differentiate if this is from pain.  Denies shortness of breath, difficulty breathing, drooling, dental pain.  Reports mild ear pain.  Reports finished course of Amoxicillin.  Reports daughter has new URI symptoms with fever.    TO BE COMPLETED Attending MD Ratliff: I personally have seen and examined this patient.  Resident note reviewed and agree on plan of care and except where noted.  See below for details.     seen in [facility]    48M with no reported contributory PMH/PSH/Meds, no known drug allergies presents to the ED with throat pain.  Reports developed throat pain about 4 weeks ago in conjunction with URI symptoms including cough, sore throat.  Reports all other symptoms resolved except for sore throat.  Reports feels worsening sore throat over last two days (since yesterday).  Reports now he feels as if he is "swallowing glass" each time he swallows and reports feels as if he is having difficulty swallowing, unable to differentiate if this is from pain.  Denies shortness of breath, difficulty breathing, drooling, dental pain.  Reports mild ear pain.  Reports took some leftover Amoxicillin on Thursday, Friday, none Saturday, some today.  Reports daughter has new URI symptoms since yesterday with fever today.    Exam:   General: NAD  HENT: head NCAT, airway patent, +mild pharyngeal erythema, no exudates, no vesicles, +R tonsil mildly more enlarged than L, uvula midline without edema, R TM with mild edema, no erythema, L TM wnl  Eyes: anicteric, no conjunctival injection  Lungs: no increased work of breathing, symmetric chest rise, no stridor  Cardiac: +S1S2, no obvious m/r/g  MSK: ranging neck and extremities freely  Neuro: moving all extremities spontaneously, nonfocal  Psych: normal mood and affect     A/P: 48M with mild R tonsillar edema, will eval for strep, EBV, viral illness (including but not limtied to Flu/Covid/RSV), will obtain CT neck with IV for possible R PTA, will consider abx if less likely viral, will give analgesia and decadron

## 2023-12-03 NOTE — ED PROVIDER NOTE - SIGN-OUT TIME
ESTABLISHED ORTHO VISIT NOTE           HISTORY OF PRESENT ILLNESS       Ernie Johnson is a 59 year old male presenting for left hip and leg pain.  It has been going on for years but worsening lately.  Denies specific injury or incident.  Describes primarily lateral hip pain.  He does however have instances such as last weekend where he has radiating pain from his buttocks down through his leg into his foot.  He has a history of chronic low back issues.  Current pain is rated a 5/10.  Has episodes where his hip will give out on him and he feels as though he could fall.  He takes ibuprofen occasionally for pain which provides mild relief.  Denies specific numbness tingling decreased sensations.  Pain is worse when he is on his feet for extended periods of time.      PAST MEDICAL, FAMILY AND SOCIAL HISTORY      Past Medical History:   Diagnosis Date   • Arthritis    • Bipolar disorder (CMS/HCC)    • Drug abuse NEC, unspec    • HLD (hyperlipidemia)       Past Surgical History:   Procedure Laterality Date   • Excision of lingual tonsil     • Hernia repair      bilateral   • Knee scope,diagnostic      Left      Social History     Occupational History   • Not on file   Tobacco Use   • Smoking status: Current Every Day Smoker     Packs/day: 0.80     Years: 30.00     Pack years: 24.00     Types: Cigarettes   • Smokeless tobacco: Never Used   Substance and Sexual Activity   • Alcohol use: Yes     Alcohol/week: 12.0 standard drinks     Types: 12 Standard drinks or equivalent per week     Comment: social   • Drug use: No   • Sexual activity: Yes     Partners: Female      Current Outpatient Medications   Medication Sig Dispense Refill   • naproxen (NAPROSYN) 500 MG tablet Take 1 tablet by mouth 2 times daily (with meals). Do not take with other NSAIDS 60 tablet 1   • tiZANidine (ZANAFLEX) 4 MG tablet Take 4 mg by mouth every 8 hours as needed for Pain.     • tamsulosin  (FLOMAX) 0.4 MG Cap Take 0.8 mg by mouth daily after a meal.     • FLUoxetine (PROZAC) 10 MG capsule Take 10 mg by mouth daily.       No current facility-administered medications for this visit.      ALLERGIES:  No Known Allergies    REVIEW OF SYSTEMS      Constitutional:  Denies fever or chills   Respiratory:  Denies cough or shortness of breath   Musculoskeletal:  See History of Present Illness     Lymphatic:  Denies swollen glands        PHYSICAL EXAM      Visit Vitals  Ht 6' 1\" (1.854 m)   Wt 79 kg   BMI 22.98 kg/m²      The patient is well developed and well nourished, in no acute distress. The patient is awake, alert, and oriented to time, place, and person.  Patient responds appropriately to questions and answers.  The skin is warm, dry.  There is no cyanosis or edema.  There is good muscle tone.  There is normal sensation to light touch.  He walks with a slight limp favoring the left side.  There is no tenderness with palpation of the trochanteric bursa.  No pain with external internal rotation of the hip joint.  Some discomfort with straight leg raise.  No significant weakness with hip flexion or abduction.  Does have weakness with resisted hip adduction.  He can go on his toes but has difficulty standing and walking on his heels on the left side compared to the right.  There is diffuse low back tenderness with palpation.    DIAGNOSTIC     AP lateral left hip shows good maintenance of joint space.  Perhaps very small early osteophyte on the lateral acetabulum but overall minimal degenerative changes.    XR LUMBAR SPINE 2 OR 3 VIEWS     HISTORY: Pain in left leg.     COMPARISON: None.     FINDINGS:     5 lumbar vertebral bodies. Mild dextro convex curvature near the  thoracolumbar junction with apex at L1 and minimal gradual levoconvex  curvature below this. No abnormal spondylolisthesis. Lumbar vertebral body  heights are preserved. Multilevel facet arthropathy, mild to moderate in  degree. Degenerative  disc disease with endplate osteophyte formation and  disc height loss. Disc height loss is greatest at L5-S1 where it is  moderate to severe. At other levels disc height loss is asymmetric  associated with a degenerative change and spinal curvature and is generally  mild to moderate in degree.     Aortoiliac atherosclerosis.        IMPRESSION:  1. Multilevel facet arthropathy and degenerative disc disease. Facet  arthropathy is mild to moderate in degree. Disc height loss is greatest at  L5-S1 where it is moderate to severe.    ASSESSMENT/PLAN      IMPRESSION:  Left hip and leg pain which appears to be more assisted with lumbar radiculopathy.    TREATMENT AND RECOMMENDATIONS:  This time will give him a course of an oral steroid for symptomatic relief.  I recommend he see the back and spine program as I do not appreciate any specific issues with his hip.  He expressed understanding agrees this plan.  He will follow up with me as needed.    Patient is seen under the supervision of Dr.Paul Barkley.     03-Dec-2023 23:15

## 2023-12-04 VITALS
RESPIRATION RATE: 18 BRPM | SYSTOLIC BLOOD PRESSURE: 115 MMHG | HEART RATE: 67 BPM | OXYGEN SATURATION: 99 % | DIASTOLIC BLOOD PRESSURE: 76 MMHG | TEMPERATURE: 98 F

## 2023-12-04 LAB
EBV EA AB SER IA-ACNC: 12.4 U/ML — HIGH
EBV EA AB SER IA-ACNC: 12.4 U/ML — HIGH
EBV EA AB TITR SER IF: POSITIVE
EBV EA AB TITR SER IF: POSITIVE
EBV EA IGG SER-ACNC: POSITIVE
EBV EA IGG SER-ACNC: POSITIVE
EBV NA IGG SER IA-ACNC: 77.3 U/ML — HIGH
EBV NA IGG SER IA-ACNC: 77.3 U/ML — HIGH
EBV PATRN SPEC IB-IMP: SIGNIFICANT CHANGE UP
EBV PATRN SPEC IB-IMP: SIGNIFICANT CHANGE UP
EBV VCA IGG AVIDITY SER QL IA: POSITIVE
EBV VCA IGG AVIDITY SER QL IA: POSITIVE
EBV VCA IGM SER IA-ACNC: 75.1 U/ML — HIGH
EBV VCA IGM SER IA-ACNC: 75.1 U/ML — HIGH
EBV VCA IGM SER IA-ACNC: <10 U/ML — SIGNIFICANT CHANGE UP
EBV VCA IGM SER IA-ACNC: <10 U/ML — SIGNIFICANT CHANGE UP
EBV VCA IGM TITR FLD: NEGATIVE — SIGNIFICANT CHANGE UP
EBV VCA IGM TITR FLD: NEGATIVE — SIGNIFICANT CHANGE UP

## 2023-12-04 PROCEDURE — 85025 COMPLETE CBC W/AUTO DIFF WBC: CPT

## 2023-12-04 PROCEDURE — 87081 CULTURE SCREEN ONLY: CPT

## 2023-12-04 PROCEDURE — 96374 THER/PROPH/DIAG INJ IV PUSH: CPT | Mod: XU

## 2023-12-04 PROCEDURE — 87880 STREP A ASSAY W/OPTIC: CPT

## 2023-12-04 PROCEDURE — 86665 EPSTEIN-BARR CAPSID VCA: CPT

## 2023-12-04 PROCEDURE — 99284 EMERGENCY DEPT VISIT MOD MDM: CPT | Mod: 25

## 2023-12-04 PROCEDURE — 80053 COMPREHEN METABOLIC PANEL: CPT

## 2023-12-04 PROCEDURE — 86664 EPSTEIN-BARR NUCLEAR ANTIGEN: CPT

## 2023-12-04 PROCEDURE — 86663 EPSTEIN-BARR ANTIBODY: CPT

## 2023-12-04 PROCEDURE — 87637 SARSCOV2&INF A&B&RSV AMP PRB: CPT

## 2023-12-04 PROCEDURE — 96375 TX/PRO/DX INJ NEW DRUG ADDON: CPT | Mod: XU

## 2023-12-04 PROCEDURE — 70491 CT SOFT TISSUE NECK W/DYE: CPT | Mod: MA

## 2023-12-04 RX ADMIN — SODIUM CHLORIDE 1000 MILLILITER(S): 9 INJECTION, SOLUTION INTRAVENOUS at 00:31

## 2023-12-04 NOTE — ED ADULT NURSE NOTE - OBJECTIVE STATEMENT
49 y/o male presents to ED from home c/o throat pain. Pt states he has URI 1 month ago with cough and sore throat. He states symptoms resolved except for sore throat. Reports R sided throat pain that worsens with swallowing. Pain worsened yesterday. Daughter has URI currently which started yesterday. Reports taking some leftover amoxicillin Thurs, Fri, today, total 6 doses. Denying fevers, SOB, chest pain, n/v/d. Pt is A&O x 4. Breathing even and unlabored. Gross motor and neuro intact. Safety and comfort provided.

## 2023-12-04 NOTE — ED ADULT NURSE REASSESSMENT NOTE - NS ED NURSE REASSESS COMMENT FT1
Pt able to tolerate juice and crackers. Reports minor pain, but overall feeling better. Awaiting dispo.

## 2023-12-04 NOTE — ED ADULT NURSE NOTE - NSFALLUNIVINTERV_ED_ALL_ED
Bed/Stretcher in lowest position, wheels locked, appropriate side rails in place/Call bell, personal items and telephone in reach/Instruct patient to call for assistance before getting out of bed/chair/stretcher/Non-slip footwear applied when patient is off stretcher/[place] to call system/Physically safe environment - no spills, clutter or unnecessary equipment/Purposeful proactive rounding/Room/bathroom lighting operational, light cord in reach Bed/Stretcher in lowest position, wheels locked, appropriate side rails in place/Call bell, personal items and telephone in reach/Instruct patient to call for assistance before getting out of bed/chair/stretcher/Non-slip footwear applied when patient is off stretcher/[place] to call system/Physically safe environment - no spills, clutter or unnecessary equipment/Purposeful proactive rounding/Room/bathroom lighting operational, light cord in reach

## 2023-12-05 ENCOUNTER — NON-APPOINTMENT (OUTPATIENT)
Age: 48
End: 2023-12-05

## 2023-12-05 ENCOUNTER — APPOINTMENT (OUTPATIENT)
Dept: OTOLARYNGOLOGY | Facility: CLINIC | Age: 48
End: 2023-12-05
Payer: COMMERCIAL

## 2023-12-05 VITALS
WEIGHT: 152 LBS | HEART RATE: 66 BPM | DIASTOLIC BLOOD PRESSURE: 70 MMHG | TEMPERATURE: 97.9 F | BODY MASS INDEX: 25.95 KG/M2 | HEIGHT: 64 IN | SYSTOLIC BLOOD PRESSURE: 117 MMHG

## 2023-12-05 DIAGNOSIS — R07.0 PAIN IN THROAT: ICD-10-CM

## 2023-12-05 PROCEDURE — 99203 OFFICE O/P NEW LOW 30 MIN: CPT

## 2023-12-08 ENCOUNTER — NON-APPOINTMENT (OUTPATIENT)
Age: 48
End: 2023-12-08

## 2023-12-15 ENCOUNTER — NON-APPOINTMENT (OUTPATIENT)
Age: 48
End: 2023-12-15

## 2023-12-15 ENCOUNTER — APPOINTMENT (OUTPATIENT)
Dept: INTERNAL MEDICINE | Facility: CLINIC | Age: 48
End: 2023-12-15
Payer: COMMERCIAL

## 2023-12-15 VITALS
WEIGHT: 150 LBS | DIASTOLIC BLOOD PRESSURE: 72 MMHG | HEART RATE: 66 BPM | SYSTOLIC BLOOD PRESSURE: 118 MMHG | HEIGHT: 64 IN | RESPIRATION RATE: 14 BRPM | BODY MASS INDEX: 25.61 KG/M2

## 2023-12-15 DIAGNOSIS — K76.0 FATTY (CHANGE OF) LIVER, NOT ELSEWHERE CLASSIFIED: ICD-10-CM

## 2023-12-15 DIAGNOSIS — M54.50 LOW BACK PAIN, UNSPECIFIED: ICD-10-CM

## 2023-12-15 DIAGNOSIS — Z00.00 ENCOUNTER FOR GENERAL ADULT MEDICAL EXAMINATION W/OUT ABNORMAL FINDINGS: ICD-10-CM

## 2023-12-15 PROCEDURE — 93000 ELECTROCARDIOGRAM COMPLETE: CPT

## 2023-12-15 PROCEDURE — 99396 PREV VISIT EST AGE 40-64: CPT | Mod: 25

## 2023-12-15 PROCEDURE — 36415 COLL VENOUS BLD VENIPUNCTURE: CPT

## 2023-12-15 PROCEDURE — 99213 OFFICE O/P EST LOW 20 MIN: CPT | Mod: 25

## 2023-12-15 RX ORDER — SILDENAFIL 50 MG/1
50 TABLET ORAL
Qty: 5 | Refills: 1 | Status: ACTIVE | COMMUNITY
Start: 2023-12-15 | End: 1900-01-01

## 2023-12-19 DIAGNOSIS — R05.9 COUGH, UNSPECIFIED: ICD-10-CM

## 2023-12-19 RX ORDER — BUDESONIDE AND FORMOTEROL FUMARATE DIHYDRATE 160; 4.5 UG/1; UG/1
160-4.5 AEROSOL RESPIRATORY (INHALATION)
Qty: 1 | Refills: 1 | Status: ACTIVE | COMMUNITY
Start: 2023-12-19 | End: 1900-01-01

## 2023-12-30 NOTE — HEALTH RISK ASSESSMENT
[Very Good] : ~his/her~  mood as very good [No] : No [0] : 2) Feeling down, depressed, or hopeless: Not at all (0) [PHQ-2 Negative - No further assessment needed] : PHQ-2 Negative - No further assessment needed [[id number]] : 0 [ColonoscopyDate] : 7/19 [Never] : Never

## 2023-12-30 NOTE — HISTORY OF PRESENT ILLNESS
[FreeTextEntry1] : MERCY WELLINGTON is a 48 year old male  presents for an annual physical. Patient is also here for f/u of chronic medical conditions, which have been stable on medications. These include fatty liver. [de-identified] : no acute complaints

## 2023-12-30 NOTE — ASSESSMENT
[FreeTextEntry1] : Blood work was drawn and sent to the lab today. The patient has been instructed to call the office next week to discuss today's lab work.  aldo current meds trial of viagra  routine health counselling provided abdominal sonogram to evaluate fatty liver annal CPE

## 2024-01-02 ENCOUNTER — APPOINTMENT (OUTPATIENT)
Dept: ULTRASOUND IMAGING | Facility: CLINIC | Age: 49
End: 2024-01-02
Payer: COMMERCIAL

## 2024-01-02 ENCOUNTER — OUTPATIENT (OUTPATIENT)
Dept: OUTPATIENT SERVICES | Facility: HOSPITAL | Age: 49
LOS: 1 days | End: 2024-01-02
Payer: COMMERCIAL

## 2024-01-02 DIAGNOSIS — K76.0 FATTY (CHANGE OF) LIVER, NOT ELSEWHERE CLASSIFIED: ICD-10-CM

## 2024-01-02 DIAGNOSIS — Z00.8 ENCOUNTER FOR OTHER GENERAL EXAMINATION: ICD-10-CM

## 2024-01-02 PROCEDURE — 76700 US EXAM ABDOM COMPLETE: CPT | Mod: 26

## 2024-01-02 PROCEDURE — 76700 US EXAM ABDOM COMPLETE: CPT

## 2024-01-19 LAB
25(OH)D3 SERPL-MCNC: 25 NG/ML
ALBUMIN SERPL ELPH-MCNC: 4.7 G/DL
ALP BLD-CCNC: 97 U/L
ALT SERPL-CCNC: 23 U/L
ANION GAP SERPL CALC-SCNC: 13 MMOL/L
APPEARANCE: CLEAR
AST SERPL-CCNC: 19 U/L
BASOPHILS # BLD AUTO: 0.05 K/UL
BASOPHILS NFR BLD AUTO: 0.5 %
BILIRUB SERPL-MCNC: 0.6 MG/DL
BILIRUBIN URINE: NEGATIVE
BLOOD URINE: NEGATIVE
BUN SERPL-MCNC: 17 MG/DL
CALCIUM SERPL-MCNC: 9.3 MG/DL
CHLORIDE SERPL-SCNC: 102 MMOL/L
CHOLEST SERPL-MCNC: 173 MG/DL
CO2 SERPL-SCNC: 26 MMOL/L
COLOR: YELLOW
CREAT SERPL-MCNC: 1.13 MG/DL
EGFR: 80 ML/MIN/1.73M2
EOSINOPHIL # BLD AUTO: 0.16 K/UL
EOSINOPHIL NFR BLD AUTO: 1.7 %
FOLATE SERPL-MCNC: 18.6 NG/ML
GLUCOSE QUALITATIVE U: NEGATIVE MG/DL
GLUCOSE SERPL-MCNC: 91 MG/DL
HCT VFR BLD CALC: 43.3 %
HDLC SERPL-MCNC: 38 MG/DL
HGB BLD-MCNC: 14.5 G/DL
IMM GRANULOCYTES NFR BLD AUTO: 0.5 %
KETONES URINE: NEGATIVE MG/DL
LDLC SERPL CALC-MCNC: 93 MG/DL
LEUKOCYTE ESTERASE URINE: NEGATIVE
LYMPHOCYTES # BLD AUTO: 1.97 K/UL
LYMPHOCYTES NFR BLD AUTO: 20.4 %
MAGNESIUM SERPL-MCNC: 2.3 MG/DL
MAN DIFF?: NORMAL
MCHC RBC-ENTMCNC: 31.8 PG
MCHC RBC-ENTMCNC: 33.5 GM/DL
MCV RBC AUTO: 95 FL
MONOCYTES # BLD AUTO: 0.4 K/UL
MONOCYTES NFR BLD AUTO: 4.1 %
NEUTROPHILS # BLD AUTO: 7.05 K/UL
NEUTROPHILS NFR BLD AUTO: 72.8 %
NITRITE URINE: NEGATIVE
NONHDLC SERPL-MCNC: 134 MG/DL
PH URINE: 7.5
PLATELET # BLD AUTO: 384 K/UL
POTASSIUM SERPL-SCNC: 4.5 MMOL/L
PROT SERPL-MCNC: 7.6 G/DL
PROTEIN URINE: NEGATIVE MG/DL
PSA SERPL-MCNC: 1.16 NG/ML
RBC # BLD: 4.56 M/UL
RBC # FLD: 14.8 %
SODIUM SERPL-SCNC: 141 MMOL/L
SPECIFIC GRAVITY URINE: 1.02
T4 FREE SERPL-MCNC: 1.4 NG/DL
T4 SERPL-MCNC: 8.2 UG/DL
TRIGL SERPL-MCNC: 245 MG/DL
TSH SERPL-ACNC: 0.86 UIU/ML
URATE SERPL-MCNC: 6.5 MG/DL
UROBILINOGEN URINE: 0.2 MG/DL
VIT B12 SERPL-MCNC: 602 PG/ML
WBC # FLD AUTO: 9.68 K/UL

## 2024-01-24 ENCOUNTER — APPOINTMENT (OUTPATIENT)
Dept: OPHTHALMOLOGY | Facility: CLINIC | Age: 49
End: 2024-01-24

## 2024-03-25 ENCOUNTER — NON-APPOINTMENT (OUTPATIENT)
Age: 49
End: 2024-03-25

## 2024-09-11 ENCOUNTER — INPATIENT (INPATIENT)
Facility: HOSPITAL | Age: 49
LOS: 1 days | Discharge: HOME | DRG: 390 | End: 2024-09-13
Attending: SURGERY | Admitting: SURGERY
Payer: COMMERCIAL

## 2024-09-11 VITALS
TEMPERATURE: 99 F | SYSTOLIC BLOOD PRESSURE: 135 MMHG | WEIGHT: 151.9 LBS | HEART RATE: 69 BPM | HEIGHT: 64 IN | RESPIRATION RATE: 20 BRPM | DIASTOLIC BLOOD PRESSURE: 85 MMHG | OXYGEN SATURATION: 98 %

## 2024-09-11 DIAGNOSIS — K56.609 UNSPECIFIED INTESTINAL OBSTRUCTION, UNSPECIFIED AS TO PARTIAL VERSUS COMPLETE OBSTRUCTION: ICD-10-CM

## 2024-09-11 LAB
ALBUMIN SERPL ELPH-MCNC: 4.9 G/DL — SIGNIFICANT CHANGE UP (ref 3.3–5)
ALP SERPL-CCNC: 103 U/L — SIGNIFICANT CHANGE UP (ref 40–120)
ALT FLD-CCNC: 19 U/L — SIGNIFICANT CHANGE UP (ref 10–45)
ANION GAP SERPL CALC-SCNC: 12 MMOL/L — SIGNIFICANT CHANGE UP (ref 5–17)
ANION GAP SERPL CALC-SCNC: 15 MMOL/L — SIGNIFICANT CHANGE UP (ref 5–17)
AST SERPL-CCNC: 15 U/L — SIGNIFICANT CHANGE UP (ref 10–40)
BASOPHILS # BLD AUTO: 0.08 K/UL — SIGNIFICANT CHANGE UP (ref 0–0.2)
BASOPHILS NFR BLD AUTO: 0.4 % — SIGNIFICANT CHANGE UP (ref 0–2)
BILIRUB SERPL-MCNC: 0.9 MG/DL — SIGNIFICANT CHANGE UP (ref 0.2–1.2)
BUN SERPL-MCNC: 10 MG/DL — SIGNIFICANT CHANGE UP (ref 7–23)
BUN SERPL-MCNC: 14 MG/DL — SIGNIFICANT CHANGE UP (ref 7–23)
CALCIUM SERPL-MCNC: 8.5 MG/DL — SIGNIFICANT CHANGE UP (ref 8.4–10.5)
CALCIUM SERPL-MCNC: 9.8 MG/DL — SIGNIFICANT CHANGE UP (ref 8.4–10.5)
CHLORIDE SERPL-SCNC: 101 MMOL/L — SIGNIFICANT CHANGE UP (ref 96–108)
CHLORIDE SERPL-SCNC: 107 MMOL/L — SIGNIFICANT CHANGE UP (ref 96–108)
CO2 SERPL-SCNC: 24 MMOL/L — SIGNIFICANT CHANGE UP (ref 22–31)
CO2 SERPL-SCNC: 24 MMOL/L — SIGNIFICANT CHANGE UP (ref 22–31)
CREAT SERPL-MCNC: 0.94 MG/DL — SIGNIFICANT CHANGE UP (ref 0.5–1.3)
CREAT SERPL-MCNC: 0.94 MG/DL — SIGNIFICANT CHANGE UP (ref 0.5–1.3)
EGFR: 99 ML/MIN/1.73M2 — SIGNIFICANT CHANGE UP
EGFR: 99 ML/MIN/1.73M2 — SIGNIFICANT CHANGE UP
EOSINOPHIL # BLD AUTO: 0.09 K/UL — SIGNIFICANT CHANGE UP (ref 0–0.5)
EOSINOPHIL NFR BLD AUTO: 0.5 % — SIGNIFICANT CHANGE UP (ref 0–6)
FLUAV AG NPH QL: SIGNIFICANT CHANGE UP
FLUBV AG NPH QL: SIGNIFICANT CHANGE UP
GAS PNL BLDV: SIGNIFICANT CHANGE UP
GAS PNL BLDV: SIGNIFICANT CHANGE UP
GLUCOSE SERPL-MCNC: 145 MG/DL — HIGH (ref 70–99)
GLUCOSE SERPL-MCNC: 91 MG/DL — SIGNIFICANT CHANGE UP (ref 70–99)
HCT VFR BLD CALC: 45.6 % — SIGNIFICANT CHANGE UP (ref 39–50)
HCT VFR BLD CALC: 53.3 % — HIGH (ref 39–50)
HGB BLD-MCNC: 14.9 G/DL — SIGNIFICANT CHANGE UP (ref 13–17)
HGB BLD-MCNC: 17.7 G/DL — HIGH (ref 13–17)
IMM GRANULOCYTES NFR BLD AUTO: 0.7 % — SIGNIFICANT CHANGE UP (ref 0–0.9)
LIDOCAIN IGE QN: 41 U/L — SIGNIFICANT CHANGE UP (ref 7–60)
LYMPHOCYTES # BLD AUTO: 0.7 K/UL — LOW (ref 1–3.3)
LYMPHOCYTES # BLD AUTO: 3.8 % — LOW (ref 13–44)
MAGNESIUM SERPL-MCNC: 2 MG/DL — SIGNIFICANT CHANGE UP (ref 1.6–2.6)
MCHC RBC-ENTMCNC: 30.8 PG — SIGNIFICANT CHANGE UP (ref 27–34)
MCHC RBC-ENTMCNC: 30.8 PG — SIGNIFICANT CHANGE UP (ref 27–34)
MCHC RBC-ENTMCNC: 32.7 GM/DL — SIGNIFICANT CHANGE UP (ref 32–36)
MCHC RBC-ENTMCNC: 33.2 GM/DL — SIGNIFICANT CHANGE UP (ref 32–36)
MCV RBC AUTO: 92.9 FL — SIGNIFICANT CHANGE UP (ref 80–100)
MCV RBC AUTO: 94.4 FL — SIGNIFICANT CHANGE UP (ref 80–100)
MONOCYTES # BLD AUTO: 0.58 K/UL — SIGNIFICANT CHANGE UP (ref 0–0.9)
MONOCYTES NFR BLD AUTO: 3.2 % — SIGNIFICANT CHANGE UP (ref 2–14)
NEUTROPHILS # BLD AUTO: 16.83 K/UL — HIGH (ref 1.8–7.4)
NEUTROPHILS NFR BLD AUTO: 91.4 % — HIGH (ref 43–77)
NRBC # BLD: 0 /100 WBCS — SIGNIFICANT CHANGE UP (ref 0–0)
NRBC # BLD: 0 /100 WBCS — SIGNIFICANT CHANGE UP (ref 0–0)
PHOSPHATE SERPL-MCNC: 2.4 MG/DL — LOW (ref 2.5–4.5)
PLATELET # BLD AUTO: 327 K/UL — SIGNIFICANT CHANGE UP (ref 150–400)
PLATELET # BLD AUTO: 408 K/UL — HIGH (ref 150–400)
POTASSIUM SERPL-MCNC: 3.8 MMOL/L — SIGNIFICANT CHANGE UP (ref 3.5–5.3)
POTASSIUM SERPL-MCNC: 4 MMOL/L — SIGNIFICANT CHANGE UP (ref 3.5–5.3)
POTASSIUM SERPL-SCNC: 3.8 MMOL/L — SIGNIFICANT CHANGE UP (ref 3.5–5.3)
POTASSIUM SERPL-SCNC: 4 MMOL/L — SIGNIFICANT CHANGE UP (ref 3.5–5.3)
PROT SERPL-MCNC: 8.4 G/DL — HIGH (ref 6–8.3)
RBC # BLD: 4.83 M/UL — SIGNIFICANT CHANGE UP (ref 4.2–5.8)
RBC # BLD: 5.74 M/UL — SIGNIFICANT CHANGE UP (ref 4.2–5.8)
RBC # FLD: 13.9 % — SIGNIFICANT CHANGE UP (ref 10.3–14.5)
RBC # FLD: 14.1 % — SIGNIFICANT CHANGE UP (ref 10.3–14.5)
RSV RNA NPH QL NAA+NON-PROBE: SIGNIFICANT CHANGE UP
SARS-COV-2 RNA SPEC QL NAA+PROBE: SIGNIFICANT CHANGE UP
SODIUM SERPL-SCNC: 140 MMOL/L — SIGNIFICANT CHANGE UP (ref 135–145)
SODIUM SERPL-SCNC: 143 MMOL/L — SIGNIFICANT CHANGE UP (ref 135–145)
TROPONIN T, HIGH SENSITIVITY RESULT: <6 NG/L — SIGNIFICANT CHANGE UP (ref 0–51)
WBC # BLD: 14.92 K/UL — HIGH (ref 3.8–10.5)
WBC # BLD: 18.41 K/UL — HIGH (ref 3.8–10.5)
WBC # FLD AUTO: 14.92 K/UL — HIGH (ref 3.8–10.5)
WBC # FLD AUTO: 18.41 K/UL — HIGH (ref 3.8–10.5)

## 2024-09-11 PROCEDURE — 99223 1ST HOSP IP/OBS HIGH 75: CPT

## 2024-09-11 PROCEDURE — 74177 CT ABD & PELVIS W/CONTRAST: CPT | Mod: 26,MC

## 2024-09-11 PROCEDURE — 99285 EMERGENCY DEPT VISIT HI MDM: CPT

## 2024-09-11 PROCEDURE — 99053 MED SERV 10PM-8AM 24 HR FAC: CPT

## 2024-09-11 RX ORDER — ACETAMINOPHEN 325 MG/1
1000 TABLET ORAL ONCE
Refills: 0 | Status: COMPLETED | OUTPATIENT
Start: 2024-09-11 | End: 2024-09-11

## 2024-09-11 RX ORDER — ROSUVASTATIN CALCIUM 10 MG/1
1 TABLET ORAL
Refills: 0 | DISCHARGE

## 2024-09-11 RX ORDER — ROSUVASTATIN CALCIUM 10 MG/1
20 TABLET ORAL AT BEDTIME
Refills: 0 | Status: DISCONTINUED | OUTPATIENT
Start: 2024-09-11 | End: 2024-09-13

## 2024-09-11 RX ORDER — FAMOTIDINE 10 MG/ML
20 INJECTION INTRAVENOUS ONCE
Refills: 0 | Status: COMPLETED | OUTPATIENT
Start: 2024-09-11 | End: 2024-09-11

## 2024-09-11 RX ORDER — ONDANSETRON 2 MG/ML
4 INJECTION, SOLUTION INTRAMUSCULAR; INTRAVENOUS ONCE
Refills: 0 | Status: COMPLETED | OUTPATIENT
Start: 2024-09-11 | End: 2024-09-11

## 2024-09-11 RX ORDER — ENOXAPARIN SODIUM 100 MG/ML
40 INJECTION SUBCUTANEOUS EVERY 24 HOURS
Refills: 0 | Status: DISCONTINUED | OUTPATIENT
Start: 2024-09-11 | End: 2024-09-13

## 2024-09-11 RX ORDER — ACETAMINOPHEN 325 MG/1
975 TABLET ORAL EVERY 6 HOURS
Refills: 0 | Status: DISCONTINUED | OUTPATIENT
Start: 2024-09-11 | End: 2024-09-13

## 2024-09-11 RX ORDER — SODIUM CHLORIDE 9 MG/ML
1000 INJECTION INTRAMUSCULAR; INTRAVENOUS; SUBCUTANEOUS ONCE
Refills: 0 | Status: COMPLETED | OUTPATIENT
Start: 2024-09-11 | End: 2024-09-11

## 2024-09-11 RX ADMIN — ONDANSETRON 4 MILLIGRAM(S): 2 INJECTION, SOLUTION INTRAMUSCULAR; INTRAVENOUS at 11:43

## 2024-09-11 RX ADMIN — ONDANSETRON 4 MILLIGRAM(S): 2 INJECTION, SOLUTION INTRAMUSCULAR; INTRAVENOUS at 06:38

## 2024-09-11 RX ADMIN — Medication 110 MILLILITER(S): at 14:57

## 2024-09-11 RX ADMIN — Medication 2 MILLIGRAM(S): at 11:49

## 2024-09-11 RX ADMIN — FAMOTIDINE 20 MILLIGRAM(S): 10 INJECTION INTRAVENOUS at 06:39

## 2024-09-11 RX ADMIN — SODIUM CHLORIDE 1000 MILLILITER(S): 9 INJECTION INTRAMUSCULAR; INTRAVENOUS; SUBCUTANEOUS at 06:39

## 2024-09-11 RX ADMIN — ENOXAPARIN SODIUM 40 MILLIGRAM(S): 100 INJECTION SUBCUTANEOUS at 22:00

## 2024-09-11 RX ADMIN — Medication 1000 MILLILITER(S): at 11:43

## 2024-09-11 RX ADMIN — ACETAMINOPHEN 1000 MILLIGRAM(S): 325 TABLET ORAL at 08:44

## 2024-09-11 RX ADMIN — ACETAMINOPHEN 400 MILLIGRAM(S): 325 TABLET ORAL at 06:39

## 2024-09-11 RX ADMIN — ROSUVASTATIN CALCIUM 20 MILLIGRAM(S): 10 TABLET ORAL at 21:59

## 2024-09-11 NOTE — ED ADULT NURSE NOTE - NSFALLUNIVINTERV_ED_ALL_ED
Ivermectin Pregnancy And Lactation Text: This medication is Pregnancy Category C and it isn't known if it is safe during pregnancy. It is also excreted in breast milk. Bed/Stretcher in lowest position, wheels locked, appropriate side rails in place/Call bell, personal items and telephone in reach/Instruct patient to call for assistance before getting out of bed/chair/stretcher/Non-slip footwear applied when patient is off stretcher/[place] to call system/Physically safe environment - no spills, clutter or unnecessary equipment/Purposeful proactive rounding/Room/bathroom lighting operational, light cord in reach

## 2024-09-11 NOTE — ED ADULT NURSE NOTE - OBJECTIVE STATEMENT
48y M A&Ox4 BIBEMS for N/V and abdominal pain. As per EMS pt had the sudden on set of generalized abdominal pain that began last night. Throughout the night the pain got worse with nausea and vomiting. Pt vomited about 6times and felt relief. Pt states he feels dizzy, weak and dry. Upon assessment pt appearing uncomfortable, endorsing generalized abdominal pain that is tender to palpation. no abdominal distention noted. Denies any Fever, Blood in stool or urine, D/CP/SOb/GI/Gu symptoms.  PMH of HLD and Gastritis. No PSH. VSS

## 2024-09-11 NOTE — ED PROVIDER NOTE - OBJECTIVE STATEMENT
48-year-old male with past medical history of hyperlipidemia and prior H pylori, no prior surgical hx, presenting with abdominal pain for 2 days.  Patient reports that 2 days ago, he had back pain and took 600 mg of ibuprofen.  Later that day, he developed abdominal pain associated with nausea and vomiting.  Patient has vomited 5-6 times.  Each time has been nonbloody, nonbilious, however large volume emesis.  Patient unable to tolerate food since 6 PM last night.  Denies any diarrhea.  Has been having bowel movements.  Denies any fevers, chills, chest pain, shortness of breath.  Denies any recent antibiotic use or travel.

## 2024-09-11 NOTE — ED PROVIDER NOTE - ATTENDING CONTRIBUTION TO CARE
I, Dr. Krista Pardo, have personally performed a face to face medical and diagnostic evaluation of the patient. I have discussed with and reviewed the Resident's and/or ACP's and/or Medical/PA/NP student's note and agree with the History, ROS, Physical Exam and MDM unless otherwise indicated. A brief summary of my personal evaluation and impression can be found below.     HPI: 48-year-old male with past medical history of hyperlipidemia and prior H pylori, no prior surgical hx, presenting with abdominal pain for 2 days.  Patient reports that 2 days ago, he had back pain and took 600 mg of ibuprofen.  Later that day, he developed abdominal pain associated with nausea and vomiting.  Patient has vomited 5-6 times.  Each time has been nonbloody, nonbilious, however large volume emesis.  Patient unable to tolerate food since 6 PM last night.  Denies any diarrhea.  Has been having bowel movements.  Denies any fevers, chills, chest pain, shortness of breath.  Denies any recent antibiotic use or travel.    PE: Nontoxic appearing, clear lungs, normal heart sounds, abd soft, diffusely tender, no rebound or guarding.     MDM: Patient with hx of gastritis/PUD presenting with diffuse abdominal pain and vomiting. Nml BMs. No previous surgeries. Concern for recurrent gastritis vs colitis. Less likely pancreatitis. plan for labs, ctap, symptom control and dispo pending workup and reassessment.

## 2024-09-11 NOTE — H&P ADULT - HISTORY OF PRESENT ILLNESS
Dina Dumont is a 48 year old man with a history of hyperlipidemia, H pylori infection (treated), and chronic constipation, presenting with two days of abdominal and back pain associated with nausea and frequent vomiting. He states that he was in his usual state of health until two days ago when he developed resting abdominal pain, which he initially treated with ibuprofen 600mg every 6 hours without improvement. He continued to attempt to work through the pain until one day ago, when he began to experience nausea. Over the past day he has now had 6-7 episodes of nonbloody, nonbilious emesis prompting presentation to the ED. He otherwise denies associated fevers, chills, shortness of breath, chest pain, oliguria, hematuria, dysuria, melena, or hematochezia.     In the ED he is hemodynamically stable and in no acute distress. Laboratory studies are notable for leukocytosis (18.41) and slight hemoconcentration (hemoglobin/hematocrit 17.7/53.3); electrolytes and liver chemistries are within normal limits. Cross-sectional imaging reveals a high grade small bowel obstruction with single transition point in the mid-abdomen. Acute care surgery is consulted for management of this patient's new primary SBO.

## 2024-09-11 NOTE — ED ADULT NURSE NOTE - ISOLATION TYPE:
Bedside and Verbal shift change report given to DEBORA Machado RN (oncoming nurse) by Merle Culver. Allison Coles RN (offgoing nurse). Report included the following information SBAR, Kardex, Intake/Output, MAR and Recent Results.
None

## 2024-09-11 NOTE — ED PROVIDER NOTE - PROGRESS NOTE DETAILS
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  Pain is began 2 nights ago patient complained of low back pain that he has had previously treated with NSAIDs following morning started develop abdominal pain symptoms worsen over the next 2 days especially last night with nausea 7 episodes of nonbloody vomitus not tolerating p.o.p.o.  Patient treated same with famotidine without relief.  Patient denies habits, abdominal surgery.  Last traveled 2 weeks ago to [facility]. patient reports moderate improvement in pain since arrival in medication.  Physical exam adult male awake and alert  Looking mildly uncomfortable  HEENT normocephalic atraumatic.  Chest clear A&P.  Abdomen mild epigastric tenderness without rebound guarding or masses.  Bowel sounds intact.  Neuro grossly intact.    Plan rule out intra-abdominal pathology CT, treat with IV fluids reassess.  Kj Leger MD, Facep Received signout on patient.  Went to bedside to introduce myself and assessed patient.  Patient is resting comfortably but quite tender on palpation of the abdomen particularly in the upper quadrants.  Labs thus far with leukocytosis and suggestive of hemoconcentration in setting of vomiting.  Patient is getting fluids and analgesia and awaiting CT scan. Patient was updated on results.  Patient is currently declining a NG tube so.  Offered analgesia he declines at this time and will let us know if he makes something.  Surgery is aware and will come evaluate

## 2024-09-11 NOTE — H&P ADULT - NSHPLABSRESULTS_GEN_ALL_CORE
CBC Basic (09-11 @ 06:52)  WBC: 18.41 Hgb: 17.7 Hct: 53.3 Plt: 408    Metabolic Panel (09-11 @ 06:52)  140  |  101  |  14  ----------------------------<  145  4.0   |  24  |  0.94  Ca: 9.8/Phos: x/Magnesium: x    Liver Chemistries (09-11 @ 06:52)  Total protein 8.4 | Albumin 4.9  TBili 0.9 | DBili x  AST 15 | ALT 19 | AlkPhos 103    Blood Gas Venous - Lactate (09-11 @ 08:56)  1.2    CT Abdomen and pelvis with IV contrast (09-11 @ 09:14)  High-grade small bowel obstruction with a single transition point in the central abdomen.

## 2024-09-11 NOTE — ED ADULT NURSE NOTE - BOWEL SOUNDS LLQ
[FreeTextEntry1] : Mr. GENEVIEVE COWAN is a 78-year-old male who presents for initial endocrine evaluation with regard to a hx of type 2 dm. The diabetes has been present for more than10 years. He denies any history of retinopathy or nephropathy. With regard to neuropathy, he does have spasms in the second toe of his right foot and too notes padding sensation on the bottom of his feet. He does take pregabalin 100 mg 3x day.   For the diabetes, he is currently taking glimepiride 1 mg 2x daily, and Januvia 50 mg once daily. He denies polyuria, polydipsia, or any visual changes. He too denies any skin lesions, skin breakdown or non-healing areas of skin. He too denies any podiatric concerns. Ophthalmologic evaluation is up to date.   Home glucose monitoring has shown values in am to be running in the low 100s-140s range. He does deny any hypoglycemia or hypoglycemic signs or symptoms.  His A1c has most recently run in the 6.4-6.6% range over the past year.  A1c test not carried out in office today.  POCT glucose returned today at 193   He does note of difficulties with balance.   Additional medical history includes that of BPH, squamous cell carcinoma, osteomyelitis (has followed with wound specialist in the past)  Medications: Lorazepam 5 mg prn   Surgeries: left Hip replacement in November 2022   FH: Father: HTN, heart disease   Social Hx: former smoker - edwina tin 1973. Denies rereational drug use. Typically has 7 alcoholic beverages week. 
present

## 2024-09-11 NOTE — ED PROVIDER NOTE - CLINICAL SUMMARY MEDICAL DECISION MAKING FREE TEXT BOX
48-year-old male with past medical history of hyperlipidemia and prior H pylori, no prior surgical hx, presenting with abdominal pain for 2 days.    VS WNL.     GENERAL: NAD, lying in bed comfortably  HEAD:  Atraumatic, Normocephalic  EYES: EOMI, conjunctiva and sclera clear  ENT: Dry mucous membranes  CHEST/LUNG: Unlabored respirations. Clear to auscultation bilaterally. No rales, rhonchi, wheezing, or rubs  HEART: Regular rate and rhythm. No murmurs, rubs, or gallops  ABDOMEN: Soft, nondistended. Diffuse TTP  NERVOUS SYSTEM:  No focal deficits.     ddx include but not limited to gastritis, gastric ulcer, pancreatitis, colitis, cholecystitis, viral infection, Will assess for acs 48-year-old male with past medical history of hyperlipidemia and prior H pylori, no prior surgical hx, presenting with abdominal pain for 2 days.    VS WNL.     GENERAL: NAD, lying in bed comfortably  HEAD:  Atraumatic, Normocephalic  EYES: EOMI, conjunctiva and sclera clear  ENT: Dry mucous membranes  CHEST/LUNG: Unlabored respirations. Clear to auscultation bilaterally. No rales, rhonchi, wheezing, or rubs  HEART: Regular rate and rhythm. No murmurs, rubs, or gallops  ABDOMEN: Soft, nondistended. Diffuse TTP  NERVOUS SYSTEM:  No focal deficits.     ddx include but not limited to gastritis, gastric ulcer, pancreatitis, colitis, cholecystitis, viral infection, Will assess for acs  Ct read small bowel obstruction.pt admitted to surg.

## 2024-09-11 NOTE — H&P ADULT - ASSESSMENT
Mr. Dumont is a 48 year old man without pertinent past medical or surgical history presenting for a primary small bowel obstruction.     - Admit to acute care surgery, Dr. Perez Handley.   - NPO, LR at 110 cc/hr.   - Will place nasogastric tube and confirm with chest radiograph. Once confirmed keep to low continuous wall suction.   - Standing acetaminophen, examine before administering any opiates.   - Low molecular weight heparin and sequential compression devices for DVT prophylaxis.   - Medication reconciliation completed.     Case discussed with attending acute care surgeon Dr. Perez Handley.     Santana Kumar, PGY-2  Acute Care Surgery ([id number])

## 2024-09-11 NOTE — ED PROVIDER NOTE - PHYSICAL EXAMINATION
GENERAL: NAD, lying in bed comfortably  HEAD:  Atraumatic, Normocephalic  EYES: EOMI, conjunctiva and sclera clear  ENT: Dry mucous membranes  CHEST/LUNG: Unlabored respirations. Clear to auscultation bilaterally. No rales, rhonchi, wheezing, or rubs  HEART: Regular rate and rhythm. No murmurs, rubs, or gallops  ABDOMEN: Soft, nondistended. Diffuse TTP  NERVOUS SYSTEM:  No focal deficits.

## 2024-09-11 NOTE — H&P ADULT - NSCORESITESY/N_GEN_A_CORE_RD
----- Message from Mine Garcia DO sent at 10/6/2020  1:02 PM CDT -----  Please notify the patient of slightly elevated cholesterol.  Recommend she decrease saturated fats.  Normal Pap, routine screening   No

## 2024-09-11 NOTE — ED ADULT NURSE REASSESSMENT NOTE - NS ED NURSE REASSESS COMMENT FT1
Pt received from BEN DAS in purple, pt is aox3 breathing even unlabored spontaneously, complaining of increased pain and nausea at this time. ZAC Becerra notified.

## 2024-09-11 NOTE — H&P ADULT - ATTENDING COMMENTS
Pt seen and examined. Agree with A/P. Pt feels better, less abdominal pain. No vomiting since this am. Admit to ACS, floor with primary SBO and dehydration. NPO/NGT/IVF, serial exams, repeat labs now, strict I/Os.

## 2024-09-12 LAB
ANION GAP SERPL CALC-SCNC: 10 MMOL/L — SIGNIFICANT CHANGE UP (ref 5–17)
BUN SERPL-MCNC: 11 MG/DL — SIGNIFICANT CHANGE UP (ref 7–23)
CALCIUM SERPL-MCNC: 8.3 MG/DL — LOW (ref 8.4–10.5)
CHLORIDE SERPL-SCNC: 108 MMOL/L — SIGNIFICANT CHANGE UP (ref 96–108)
CO2 SERPL-SCNC: 23 MMOL/L — SIGNIFICANT CHANGE UP (ref 22–31)
CREAT SERPL-MCNC: 0.96 MG/DL — SIGNIFICANT CHANGE UP (ref 0.5–1.3)
EGFR: 97 ML/MIN/1.73M2 — SIGNIFICANT CHANGE UP
GLUCOSE SERPL-MCNC: 93 MG/DL — SIGNIFICANT CHANGE UP (ref 70–99)
HCT VFR BLD CALC: 43.1 % — SIGNIFICANT CHANGE UP (ref 39–50)
HGB BLD-MCNC: 14.1 G/DL — SIGNIFICANT CHANGE UP (ref 13–17)
MAGNESIUM SERPL-MCNC: 2 MG/DL — SIGNIFICANT CHANGE UP (ref 1.6–2.6)
MCHC RBC-ENTMCNC: 31.4 PG — SIGNIFICANT CHANGE UP (ref 27–34)
MCHC RBC-ENTMCNC: 32.7 GM/DL — SIGNIFICANT CHANGE UP (ref 32–36)
MCV RBC AUTO: 96 FL — SIGNIFICANT CHANGE UP (ref 80–100)
NRBC # BLD: 0 /100 WBCS — SIGNIFICANT CHANGE UP (ref 0–0)
PHOSPHATE SERPL-MCNC: 2.3 MG/DL — LOW (ref 2.5–4.5)
PLATELET # BLD AUTO: 309 K/UL — SIGNIFICANT CHANGE UP (ref 150–400)
POTASSIUM SERPL-MCNC: 3.6 MMOL/L — SIGNIFICANT CHANGE UP (ref 3.5–5.3)
POTASSIUM SERPL-SCNC: 3.6 MMOL/L — SIGNIFICANT CHANGE UP (ref 3.5–5.3)
RBC # BLD: 4.49 M/UL — SIGNIFICANT CHANGE UP (ref 4.2–5.8)
RBC # FLD: 14.2 % — SIGNIFICANT CHANGE UP (ref 10.3–14.5)
SODIUM SERPL-SCNC: 141 MMOL/L — SIGNIFICANT CHANGE UP (ref 135–145)
WBC # BLD: 12.46 K/UL — HIGH (ref 3.8–10.5)
WBC # FLD AUTO: 12.46 K/UL — HIGH (ref 3.8–10.5)

## 2024-09-12 RX ORDER — POTASSIUM CHLORIDE 10 MEQ
10 TABLET, EXT RELEASE, PARTICLES/CRYSTALS ORAL
Refills: 0 | Status: COMPLETED | OUTPATIENT
Start: 2024-09-12 | End: 2024-09-12

## 2024-09-12 RX ORDER — SODIUM PHOSPHATE, MONOBASIC, MONOHYDRATE AND SODIUM PHOSPHATE, DIBASIC ANHYDROUS 276; 142 MG/ML; MG/ML
30 INJECTION, SOLUTION INTRAVENOUS ONCE
Refills: 0 | Status: COMPLETED | OUTPATIENT
Start: 2024-09-12 | End: 2024-09-12

## 2024-09-12 RX ORDER — FLU VACCINE TS 2012-2013(5YR+) 45MCG/.5ML
0.5 VIAL (ML) INTRAMUSCULAR ONCE
Refills: 0 | Status: DISCONTINUED | OUTPATIENT
Start: 2024-09-12 | End: 2024-09-13

## 2024-09-12 RX ADMIN — ROSUVASTATIN CALCIUM 20 MILLIGRAM(S): 10 TABLET ORAL at 22:29

## 2024-09-12 RX ADMIN — Medication 100 MILLIEQUIVALENT(S): at 10:42

## 2024-09-12 RX ADMIN — ENOXAPARIN SODIUM 40 MILLIGRAM(S): 100 INJECTION SUBCUTANEOUS at 22:29

## 2024-09-12 RX ADMIN — Medication 100 MILLIEQUIVALENT(S): at 17:05

## 2024-09-12 RX ADMIN — ACETAMINOPHEN 975 MILLIGRAM(S): 325 TABLET ORAL at 01:41

## 2024-09-12 RX ADMIN — Medication 100 MILLIEQUIVALENT(S): at 13:39

## 2024-09-12 RX ADMIN — ACETAMINOPHEN 975 MILLIGRAM(S): 325 TABLET ORAL at 01:11

## 2024-09-12 RX ADMIN — SODIUM PHOSPHATE, MONOBASIC, MONOHYDRATE AND SODIUM PHOSPHATE, DIBASIC ANHYDROUS 85 MILLIMOLE(S): 276; 142 INJECTION, SOLUTION INTRAVENOUS at 20:16

## 2024-09-12 NOTE — PROGRESS NOTE ADULT - SUBJECTIVE AND OBJECTIVE BOX
TRAUMA SURGERY PROGRESS NOTE ([id number])    SUBJECTIVE:  No acute events overnight. Patient reports pain is well controlled. Endorsing flatus, denies BM    OBJECTIVE:  Vital Signs Last 24 Hrs  T(C): 36.7 (12 Sep 2024 10:55), Max: 37.1 (11 Sep 2024 15:34)  T(F): 98 (12 Sep 2024 10:55), Max: 98.8 (11 Sep 2024 15:34)  HR: 72 (12 Sep 2024 10:55) (60 - 79)  BP: 120/70 (12 Sep 2024 10:55) (107/65 - 125/77)  BP(mean): 88 (11 Sep 2024 11:19) (88 - 88)  RR: 18 (12 Sep 2024 10:55) (17 - 18)  SpO2: 97% (12 Sep 2024 10:55) (95% - 99%)    Parameters below as of 12 Sep 2024 10:55  Patient On (Oxygen Delivery Method): room air        Physical Examination:  GEN: NAD, resting quietly  NEURO: AAOx3, CN II-XII grossly intact, no focal deficits  PULM: symmetric chest rise bilaterally, no increased WOB  ABD: soft, nontender, mild distension   EXTR: no lower extremity edema, moving all extremities

## 2024-09-12 NOTE — PROGRESS NOTE ADULT - ASSESSMENT
Mr. Dumont is a 48 year old man without pertinent past medical or surgical history presenting for a primary small bowel obstruction.     Plan:   - NPO/IVF  - Lovenox for VTE ppx  - Tylenol for pain control, exam before additional pain medications    Acute Care Surgery ([id number])

## 2024-09-13 ENCOUNTER — TRANSCRIPTION ENCOUNTER (OUTPATIENT)
Age: 49
End: 2024-09-13

## 2024-09-13 VITALS
OXYGEN SATURATION: 96 % | DIASTOLIC BLOOD PRESSURE: 65 MMHG | RESPIRATION RATE: 18 BRPM | HEART RATE: 65 BPM | TEMPERATURE: 98 F | SYSTOLIC BLOOD PRESSURE: 112 MMHG

## 2024-09-13 LAB
ANION GAP SERPL CALC-SCNC: 11 MMOL/L — SIGNIFICANT CHANGE UP (ref 5–17)
BUN SERPL-MCNC: 8 MG/DL — SIGNIFICANT CHANGE UP (ref 7–23)
CALCIUM SERPL-MCNC: 8.7 MG/DL — SIGNIFICANT CHANGE UP (ref 8.4–10.5)
CHLORIDE SERPL-SCNC: 105 MMOL/L — SIGNIFICANT CHANGE UP (ref 96–108)
CO2 SERPL-SCNC: 25 MMOL/L — SIGNIFICANT CHANGE UP (ref 22–31)
CREAT SERPL-MCNC: 0.9 MG/DL — SIGNIFICANT CHANGE UP (ref 0.5–1.3)
EGFR: 105 ML/MIN/1.73M2 — SIGNIFICANT CHANGE UP
GLUCOSE SERPL-MCNC: 94 MG/DL — SIGNIFICANT CHANGE UP (ref 70–99)
HCT VFR BLD CALC: 43.6 % — SIGNIFICANT CHANGE UP (ref 39–50)
HGB BLD-MCNC: 14.2 G/DL — SIGNIFICANT CHANGE UP (ref 13–17)
MAGNESIUM SERPL-MCNC: 2.1 MG/DL — SIGNIFICANT CHANGE UP (ref 1.6–2.6)
MCHC RBC-ENTMCNC: 30.7 PG — SIGNIFICANT CHANGE UP (ref 27–34)
MCHC RBC-ENTMCNC: 32.6 GM/DL — SIGNIFICANT CHANGE UP (ref 32–36)
MCV RBC AUTO: 94.2 FL — SIGNIFICANT CHANGE UP (ref 80–100)
NRBC # BLD: 0 /100 WBCS — SIGNIFICANT CHANGE UP (ref 0–0)
PHOSPHATE SERPL-MCNC: 4.1 MG/DL — SIGNIFICANT CHANGE UP (ref 2.5–4.5)
PLATELET # BLD AUTO: 330 K/UL — SIGNIFICANT CHANGE UP (ref 150–400)
POTASSIUM SERPL-MCNC: 3.6 MMOL/L — SIGNIFICANT CHANGE UP (ref 3.5–5.3)
POTASSIUM SERPL-SCNC: 3.6 MMOL/L — SIGNIFICANT CHANGE UP (ref 3.5–5.3)
RBC # BLD: 4.63 M/UL — SIGNIFICANT CHANGE UP (ref 4.2–5.8)
RBC # FLD: 14 % — SIGNIFICANT CHANGE UP (ref 10.3–14.5)
SODIUM SERPL-SCNC: 141 MMOL/L — SIGNIFICANT CHANGE UP (ref 135–145)
WBC # BLD: 10.76 K/UL — HIGH (ref 3.8–10.5)
WBC # FLD AUTO: 10.76 K/UL — HIGH (ref 3.8–10.5)

## 2024-09-13 PROCEDURE — 85025 COMPLETE CBC W/AUTO DIFF WBC: CPT

## 2024-09-13 PROCEDURE — 82330 ASSAY OF CALCIUM: CPT

## 2024-09-13 PROCEDURE — 82435 ASSAY OF BLOOD CHLORIDE: CPT

## 2024-09-13 PROCEDURE — 0241U: CPT

## 2024-09-13 PROCEDURE — 96376 TX/PRO/DX INJ SAME DRUG ADON: CPT

## 2024-09-13 PROCEDURE — 85018 HEMOGLOBIN: CPT

## 2024-09-13 PROCEDURE — 84100 ASSAY OF PHOSPHORUS: CPT

## 2024-09-13 PROCEDURE — 82947 ASSAY GLUCOSE BLOOD QUANT: CPT

## 2024-09-13 PROCEDURE — 99232 SBSQ HOSP IP/OBS MODERATE 35: CPT

## 2024-09-13 PROCEDURE — 99285 EMERGENCY DEPT VISIT HI MDM: CPT | Mod: 25

## 2024-09-13 PROCEDURE — 83605 ASSAY OF LACTIC ACID: CPT

## 2024-09-13 PROCEDURE — 36415 COLL VENOUS BLD VENIPUNCTURE: CPT

## 2024-09-13 PROCEDURE — 84295 ASSAY OF SERUM SODIUM: CPT

## 2024-09-13 PROCEDURE — 80048 BASIC METABOLIC PNL TOTAL CA: CPT

## 2024-09-13 PROCEDURE — 85014 HEMATOCRIT: CPT

## 2024-09-13 PROCEDURE — 85027 COMPLETE CBC AUTOMATED: CPT

## 2024-09-13 PROCEDURE — 96374 THER/PROPH/DIAG INJ IV PUSH: CPT

## 2024-09-13 PROCEDURE — 80053 COMPREHEN METABOLIC PANEL: CPT

## 2024-09-13 PROCEDURE — 83735 ASSAY OF MAGNESIUM: CPT

## 2024-09-13 PROCEDURE — 74177 CT ABD & PELVIS W/CONTRAST: CPT | Mod: MC

## 2024-09-13 PROCEDURE — 84132 ASSAY OF SERUM POTASSIUM: CPT

## 2024-09-13 PROCEDURE — 83690 ASSAY OF LIPASE: CPT

## 2024-09-13 PROCEDURE — 82803 BLOOD GASES ANY COMBINATION: CPT

## 2024-09-13 PROCEDURE — 96375 TX/PRO/DX INJ NEW DRUG ADDON: CPT

## 2024-09-13 PROCEDURE — 87637 SARSCOV2&INF A&B&RSV AMP PRB: CPT

## 2024-09-13 PROCEDURE — 84484 ASSAY OF TROPONIN QUANT: CPT

## 2024-09-13 RX ORDER — PANTOPRAZOLE SODIUM 40 MG
1 TABLET, DELAYED RELEASE (ENTERIC COATED) ORAL
Qty: 30 | Refills: 0
Start: 2024-09-13 | End: 2024-10-12

## 2024-09-13 RX ORDER — FAMOTIDINE 10 MG/ML
20 INJECTION INTRAVENOUS ONCE
Refills: 0 | Status: COMPLETED | OUTPATIENT
Start: 2024-09-13 | End: 2024-09-13

## 2024-09-13 RX ORDER — PANTOPRAZOLE SODIUM 40 MG
40 TABLET, DELAYED RELEASE (ENTERIC COATED) ORAL
Refills: 0 | Status: DISCONTINUED | OUTPATIENT
Start: 2024-09-13 | End: 2024-09-13

## 2024-09-13 RX ORDER — POTASSIUM CHLORIDE 10 MEQ
20 TABLET, EXT RELEASE, PARTICLES/CRYSTALS ORAL
Refills: 0 | Status: DISCONTINUED | OUTPATIENT
Start: 2024-09-13 | End: 2024-09-13

## 2024-09-13 RX ADMIN — FAMOTIDINE 20 MILLIGRAM(S): 10 INJECTION INTRAVENOUS at 07:31

## 2024-09-13 RX ADMIN — Medication 20 MILLIEQUIVALENT(S): at 17:42

## 2024-09-13 NOTE — DISCHARGE NOTE PROVIDER - HOSPITAL COURSE
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 He states that he was in his usual state of health until two days ago when he developed resting abdominal pain, which he initially treated with ibuprofen 600mg every 6 hours without improvement. He continued to attempt to work through the pain until one day ago, when he began to experience nausea. Over the past day he has now had 6-7 episodes of nonbloody, nonbilious emesis prompting presentation to the ED. He otherwise denies associated fevers, chills, shortness of breath, chest pain, oliguria, hematuria, dysuria, melena, or hematochezia.     In the ED he is hemodynamically stable and in no acute distress. Laboratory studies are notable for leukocytosis (18.41) and slight hemoconcentration (hemoglobin/hematocrit 17.7/53.3); electrolytes and liver chemistries are within normal limits. Cross-sectional imaging reveals a high grade small bowel obstruction with single transition point in the mid-abdomen. Acute care surgery is consulted for management of this patient's new primary SBO.     Admit to acute care surgery, Dr. Perez Handley.  NPO, LR at 110 cc/hr. Will place nasogastric tube and confirm with chest radiograph. Once confirmed keep to low continuous wall suction. Diet adv to clears then regular. At the time of discharge, the patient was hemodynamically stable, was tolerating PO diet, was voiding urine and passing stool, was ambulating, and was comfortable with adequate pain control. The patient was instructed to follow up with PMD within 1-2 weeks after discharge from the hospital. The patient felt comfortable with discharge. The patient was discharged to home. The patient had no other issues.

## 2024-09-13 NOTE — PROGRESS NOTE ADULT - ASSESSMENT
48 year old man without pertinent past medical or surgical history presenting for a primary small bowel obstruction.     Plan:   - NPO/IVF  - Lovenox for VTE ppx  - Tylenol for pain control, exam before additional pain medications    Acute Care Surgery ([id number])     48 year old man without pertinent past medical or surgical history presenting for a primary small bowel obstruction.     Plan:   - clears, ADAT  - protonix for heartburn   - Lovenox for VTE ppx  - Tylenol for pain control, exam before additional pain medications    Acute Care Surgery ([id number])

## 2024-09-13 NOTE — PROGRESS NOTE ADULT - SUBJECTIVE AND OBJECTIVE BOX
SURGERY DAILY PROGRESS NOTE:       SUBJECTIVE/ROS: Patient seen and evaluated on AM rounds.   Denies nausea, vomiting, chest pain, shortness of breath       OBJECTIVE:    Vital Signs Last 24 Hrs  T(C): 36.8 (13 Sep 2024 05:17), Max: 37.1 (12 Sep 2024 21:37)  T(F): 98.3 (13 Sep 2024 05:17), Max: 98.8 (12 Sep 2024 21:37)  HR: 60 (13 Sep 2024 05:17) (60 - 72)  BP: 120/69 (13 Sep 2024 05:17) (109/72 - 120/70)  BP(mean): --  RR: 18 (13 Sep 2024 05:17) (18 - 18)  SpO2: 96% (13 Sep 2024 05:17) (95% - 97%)    Parameters below as of 13 Sep 2024 05:17  Patient On (Oxygen Delivery Method): room air      I&O's Detail    12 Sep 2024 07:01  -  13 Sep 2024 06:12  --------------------------------------------------------  IN:    Lactated Ringers: 1320 mL    Oral Fluid: 670 mL  Total IN: 1990 mL    OUT:  Total OUT: 0 mL    Total NET: 1990 mL        Daily     Daily   MEDICATIONS  (STANDING):  acetaminophen     Tablet .. 975 milliGRAM(s) Oral every 6 hours  enoxaparin Injectable 40 milliGRAM(s) SubCutaneous every 24 hours  famotidine    Tablet 20 milliGRAM(s) Oral once  influenza   Vaccine 0.5 milliLiter(s) IntraMuscular once  rosuvastatin 20 milliGRAM(s) Oral at bedtime    MEDICATIONS  (PRN):      LABS:                        14.1   12.46 )-----------( 309      ( 12 Sep 2024 07:08 )             43.1     09-12    141  |  108  |  11  ----------------------------<  93  3.6   |  23  |  0.96    Ca    8.3<L>      12 Sep 2024 07:11  Phos  2.3     09-12  Mg     2.0     09-12    TPro  8.4<H>  /  Alb  4.9  /  TBili  0.9  /  DBili  x   /  AST  15  /  ALT  19  /  AlkPhos  103  09-11      Urinalysis Basic - ( 12 Sep 2024 07:11 )    Color: x / Appearance: x / SG: x / pH: x  Gluc: 93 mg/dL / Ketone: x  / Bili: x / Urobili: x   Blood: x / Protein: x / Nitrite: x   Leuk Esterase: x / RBC: x / WBC x   Sq Epi: x / Non Sq Epi: x / Bacteria: x            Physical Examination:  GEN: NAD, resting quietly  NEURO: AAOx3, CN II-XII grossly intact, no focal deficits  PULM: symmetric chest rise bilaterally, no increased WOB  ABD: soft, nontender, mild distension   EXTR: no lower extremity edema, moving all extremities             SURGERY DAILY PROGRESS NOTE:       SUBJECTIVE/ROS: Patient seen and evaluated on AM rounds.   Denies nausea, vomiting, chest pain, shortness of breath. Patient still belching a little bit. Passing flatus, no bowel movements. Tolerating clears. Had some heartburn this morning.        OBJECTIVE:    Vital Signs Last 24 Hrs  T(C): 36.8 (13 Sep 2024 05:17), Max: 37.1 (12 Sep 2024 21:37)  T(F): 98.3 (13 Sep 2024 05:17), Max: 98.8 (12 Sep 2024 21:37)  HR: 60 (13 Sep 2024 05:17) (60 - 72)  BP: 120/69 (13 Sep 2024 05:17) (109/72 - 120/70)  BP(mean): --  RR: 18 (13 Sep 2024 05:17) (18 - 18)  SpO2: 96% (13 Sep 2024 05:17) (95% - 97%)    Parameters below as of 13 Sep 2024 05:17  Patient On (Oxygen Delivery Method): room air      I&O's Detail    12 Sep 2024 07:01  -  13 Sep 2024 06:12  --------------------------------------------------------  IN:    Lactated Ringers: 1320 mL    Oral Fluid: 670 mL  Total IN: 1990 mL    OUT:  Total OUT: 0 mL    Total NET: 1990 mL        Daily     Daily   MEDICATIONS  (STANDING):  acetaminophen     Tablet .. 975 milliGRAM(s) Oral every 6 hours  enoxaparin Injectable 40 milliGRAM(s) SubCutaneous every 24 hours  famotidine    Tablet 20 milliGRAM(s) Oral once  influenza   Vaccine 0.5 milliLiter(s) IntraMuscular once  rosuvastatin 20 milliGRAM(s) Oral at bedtime    MEDICATIONS  (PRN):      LABS:                        14.1   12.46 )-----------( 309      ( 12 Sep 2024 07:08 )             43.1     09-12    141  |  108  |  11  ----------------------------<  93  3.6   |  23  |  0.96    Ca    8.3<L>      12 Sep 2024 07:11  Phos  2.3     09-12  Mg     2.0     09-12    TPro  8.4<H>  /  Alb  4.9  /  TBili  0.9  /  DBili  x   /  AST  15  /  ALT  19  /  AlkPhos  103  09-11      Urinalysis Basic - ( 12 Sep 2024 07:11 )    Color: x / Appearance: x / SG: x / pH: x  Gluc: 93 mg/dL / Ketone: x  / Bili: x / Urobili: x   Blood: x / Protein: x / Nitrite: x   Leuk Esterase: x / RBC: x / WBC x   Sq Epi: x / Non Sq Epi: x / Bacteria: x            Physical Examination:  GEN: NAD, resting quietly  NEURO: AAOx3, CN II-XII grossly intact, no focal deficits  PULM: symmetric chest rise bilaterally, no increased WOB  ABD: soft, nontender, mild distension   EXTR: no lower extremity edema, moving all extremities

## 2024-09-13 NOTE — DISCHARGE NOTE PROVIDER - NSDCCPCAREPLAN_GEN_ALL_CORE_FT
PRINCIPAL DISCHARGE DIAGNOSIS  Diagnosis: SBO (small bowel obstruction)  Assessment and Plan of Treatment: -Notify your surgeon and return to ER for temperatures greater than 101, chills sweats, pain not controlled with pain medications, persistent nausea and vomiting, inability to tolerate food, significant abdominal bloating/distension, no passing of flatus or bowel movements, or acutely concerning matters to you, that may require urgent medical attention.   -Please follow up with your PMD within 1 week regarding your hospitalization.

## 2024-09-13 NOTE — DISCHARGE NOTE NURSING/CASE MANAGEMENT/SOCIAL WORK - PATIENT PORTAL LINK FT
You can access the FollowMyHealth Patient Portal offered by [facility] by registering at the following website: [email or web address]. By joining [facility]’s FollowMyHealth portal, you will also be able to view your health information using other applications (apps) compatible with our system.

## 2024-09-13 NOTE — DISCHARGE NOTE PROVIDER - CARE PROVIDER_API CALL
Candace Epps Dicandi  Surgery  [address] [place] [address], [facility] [address]  [place] [address]  Phone: [phone]  Fax: [phone]  Follow Up Time: Routine

## 2024-09-13 NOTE — DISCHARGE NOTE PROVIDER - NSDCMRMEDTOKEN_GEN_ALL_CORE_FT
Crestor 20 mg oral tablet: 1 tab(s) orally once a day (at bedtime)  Protonix 40 mg oral delayed release tablet: 1 tab(s) orally

## 2024-09-14 ENCOUNTER — TRANSCRIPTION ENCOUNTER (OUTPATIENT)
Age: 49
End: 2024-09-14

## 2024-09-17 PROBLEM — K29.70 GASTRITIS, UNSPECIFIED, WITHOUT BLEEDING: Chronic | Status: ACTIVE | Noted: 2024-09-11

## 2024-10-30 ENCOUNTER — APPOINTMENT (OUTPATIENT)
Dept: GASTROENTEROLOGY | Facility: CLINIC | Age: 49
End: 2024-10-30
Payer: COMMERCIAL

## 2024-10-30 VITALS
TEMPERATURE: 98.2 F | HEIGHT: 64 IN | OXYGEN SATURATION: 99 % | BODY MASS INDEX: 25.61 KG/M2 | WEIGHT: 150 LBS | DIASTOLIC BLOOD PRESSURE: 79 MMHG | SYSTOLIC BLOOD PRESSURE: 122 MMHG | RESPIRATION RATE: 15 BRPM | HEART RATE: 75 BPM

## 2024-10-30 DIAGNOSIS — Z86.39 PERSONAL HISTORY OF OTHER ENDOCRINE, NUTRITIONAL AND METABOLIC DISEASE: ICD-10-CM

## 2024-10-30 DIAGNOSIS — Z82.49 FAMILY HISTORY OF ISCHEMIC HEART DISEASE AND OTHER DISEASES OF THE CIRCULATORY SYSTEM: ICD-10-CM

## 2024-10-30 DIAGNOSIS — K52.9 NONINFECTIVE GASTROENTERITIS AND COLITIS, UNSPECIFIED: ICD-10-CM

## 2024-10-30 DIAGNOSIS — K60.2 ANAL FISSURE, UNSPECIFIED: ICD-10-CM

## 2024-10-30 DIAGNOSIS — K59.09 OTHER CONSTIPATION: ICD-10-CM

## 2024-10-30 PROCEDURE — 99204 OFFICE O/P NEW MOD 45 MIN: CPT

## 2024-10-31 ENCOUNTER — APPOINTMENT (OUTPATIENT)
Dept: INTERNAL MEDICINE | Facility: CLINIC | Age: 49
End: 2024-10-31

## 2024-10-31 VITALS — DIASTOLIC BLOOD PRESSURE: 76 MMHG | HEART RATE: 72 BPM | SYSTOLIC BLOOD PRESSURE: 124 MMHG | RESPIRATION RATE: 14 BRPM

## 2024-10-31 DIAGNOSIS — K56.609 UNSPECIFIED INTESTINAL OBSTRUCTION, UNSPECIFIED AS TO PARTIAL VERSUS COMPLETE OBSTRUCTION: ICD-10-CM

## 2024-10-31 DIAGNOSIS — M54.50 LOW BACK PAIN, UNSPECIFIED: ICD-10-CM

## 2024-10-31 DIAGNOSIS — Z23 ENCOUNTER FOR IMMUNIZATION: ICD-10-CM

## 2024-10-31 DIAGNOSIS — L30.0 NUMMULAR DERMATITIS: ICD-10-CM

## 2024-10-31 DIAGNOSIS — E78.1 PURE HYPERGLYCERIDEMIA: ICD-10-CM

## 2024-10-31 PROCEDURE — G0008: CPT

## 2024-10-31 PROCEDURE — 99214 OFFICE O/P EST MOD 30 MIN: CPT | Mod: 25

## 2024-10-31 PROCEDURE — 90656 IIV3 VACC NO PRSV 0.5 ML IM: CPT

## 2024-10-31 RX ORDER — TRIAMCINOLONE ACETONIDE 1 MG/G
0.1 OINTMENT TOPICAL TWICE DAILY
Qty: 1 | Refills: 2 | Status: ACTIVE | COMMUNITY
Start: 2024-10-31 | End: 1900-01-01

## 2024-11-01 LAB — HEMOCCULT STL QL IA: NEGATIVE

## 2024-11-06 LAB
BAKER'S YEAST AB QL: 5.2 UNITS
BAKER'S YEAST IGA QL IA: 6.3 UNITS
BAKER'S YEAST IGA QN IA: NEGATIVE
BAKER'S YEAST IGG QN IA: NEGATIVE

## 2024-11-29 ENCOUNTER — APPOINTMENT (OUTPATIENT)
Dept: MRI IMAGING | Facility: CLINIC | Age: 49
End: 2024-11-29
Payer: COMMERCIAL

## 2024-11-29 ENCOUNTER — OUTPATIENT (OUTPATIENT)
Dept: OUTPATIENT SERVICES | Facility: HOSPITAL | Age: 49
LOS: 1 days | End: 2024-11-29
Payer: COMMERCIAL

## 2024-11-29 DIAGNOSIS — K52.9 NONINFECTIVE GASTROENTERITIS AND COLITIS, UNSPECIFIED: ICD-10-CM

## 2024-11-29 DIAGNOSIS — M54.50 LOW BACK PAIN, UNSPECIFIED: ICD-10-CM

## 2024-11-29 PROCEDURE — 72148 MRI LUMBAR SPINE W/O DYE: CPT | Mod: 26

## 2024-11-29 PROCEDURE — 72148 MRI LUMBAR SPINE W/O DYE: CPT

## 2024-11-29 PROCEDURE — 72197 MRI PELVIS W/O & W/DYE: CPT | Mod: 26

## 2024-11-29 PROCEDURE — 74183 MRI ABD W/O CNTR FLWD CNTR: CPT | Mod: 26

## 2024-11-29 PROCEDURE — 72197 MRI PELVIS W/O & W/DYE: CPT

## 2024-11-29 PROCEDURE — 74183 MRI ABD W/O CNTR FLWD CNTR: CPT

## 2024-12-27 ENCOUNTER — NON-APPOINTMENT (OUTPATIENT)
Age: 49
End: 2024-12-27

## 2024-12-27 ENCOUNTER — APPOINTMENT (OUTPATIENT)
Dept: INTERNAL MEDICINE | Facility: CLINIC | Age: 49
End: 2024-12-27
Payer: COMMERCIAL

## 2024-12-27 VITALS
BODY MASS INDEX: 25.9 KG/M2 | WEIGHT: 148 LBS | DIASTOLIC BLOOD PRESSURE: 70 MMHG | HEIGHT: 63.5 IN | SYSTOLIC BLOOD PRESSURE: 120 MMHG

## 2024-12-27 DIAGNOSIS — Z00.00 ENCOUNTER FOR GENERAL ADULT MEDICAL EXAMINATION W/OUT ABNORMAL FINDINGS: ICD-10-CM

## 2024-12-27 DIAGNOSIS — M46.1 SACROILIITIS, NOT ELSEWHERE CLASSIFIED: ICD-10-CM

## 2024-12-27 DIAGNOSIS — R93.7 ABNORMAL FINDINGS ON DIAGNOSTIC IMAGING OF OTHER PARTS OF MUSCULOSKELETAL SYSTEM: ICD-10-CM

## 2024-12-27 DIAGNOSIS — Z87.898 PERSONAL HISTORY OF OTHER SPECIFIED CONDITIONS: ICD-10-CM

## 2024-12-27 PROCEDURE — 36415 COLL VENOUS BLD VENIPUNCTURE: CPT

## 2024-12-27 PROCEDURE — 93000 ELECTROCARDIOGRAM COMPLETE: CPT

## 2024-12-27 PROCEDURE — 99396 PREV VISIT EST AGE 40-64: CPT

## 2024-12-28 LAB
ALBUMIN SERPL ELPH-MCNC: 5 G/DL
ALP BLD-CCNC: 114 U/L
ALT SERPL-CCNC: 28 U/L
ANION GAP SERPL CALC-SCNC: 11 MMOL/L
APPEARANCE: CLEAR
AST SERPL-CCNC: 22 U/L
BASOPHILS # BLD AUTO: 0.09 K/UL
BASOPHILS NFR BLD AUTO: 0.8 %
BILIRUB SERPL-MCNC: 0.8 MG/DL
BILIRUBIN URINE: NEGATIVE
BLOOD URINE: NEGATIVE
BUN SERPL-MCNC: 14 MG/DL
CALCIUM SERPL-MCNC: 9.8 MG/DL
CHLORIDE SERPL-SCNC: 102 MMOL/L
CHOLEST SERPL-MCNC: 123 MG/DL
CO2 SERPL-SCNC: 27 MMOL/L
COLOR: YELLOW
CREAT SERPL-MCNC: 1.01 MG/DL
EGFR: 91 ML/MIN/1.73M2
EOSINOPHIL # BLD AUTO: 0.22 K/UL
EOSINOPHIL NFR BLD AUTO: 1.8 %
ESTIMATED AVERAGE GLUCOSE: 131 MG/DL
GLUCOSE QUALITATIVE U: NEGATIVE MG/DL
GLUCOSE SERPL-MCNC: 105 MG/DL
HBA1C MFR BLD HPLC: 6.2 %
HCT VFR BLD CALC: 51.9 %
HDLC SERPL-MCNC: 45 MG/DL
HGB BLD-MCNC: 16.7 G/DL
IMM GRANULOCYTES NFR BLD AUTO: 0.5 %
KETONES URINE: NEGATIVE MG/DL
LDH SERPL-CCNC: 256 U/L
LDLC SERPL CALC-MCNC: 53 MG/DL
LEUKOCYTE ESTERASE URINE: NEGATIVE
LYMPHOCYTES # BLD AUTO: 1.69 K/UL
LYMPHOCYTES NFR BLD AUTO: 14.1 %
MAN DIFF?: NORMAL
MCHC RBC-ENTMCNC: 31.2 PG
MCHC RBC-ENTMCNC: 32.2 G/DL
MCV RBC AUTO: 96.8 FL
MONOCYTES # BLD AUTO: 0.5 K/UL
MONOCYTES NFR BLD AUTO: 4.2 %
NEUTROPHILS # BLD AUTO: 9.43 K/UL
NEUTROPHILS NFR BLD AUTO: 78.6 %
NITRITE URINE: NEGATIVE
NONHDLC SERPL-MCNC: 78 MG/DL
PH URINE: 6
PLATELET # BLD AUTO: 403 K/UL
POTASSIUM SERPL-SCNC: 4.8 MMOL/L
PROT SERPL-MCNC: 7.8 G/DL
PROTEIN URINE: NEGATIVE MG/DL
PSA SERPL-MCNC: 1.25 NG/ML
RBC # BLD: 5.36 M/UL
RBC # FLD: 14.6 %
SODIUM SERPL-SCNC: 141 MMOL/L
SPECIFIC GRAVITY URINE: 1.02
T4 FREE SERPL-MCNC: 1.3 NG/DL
TRIGL SERPL-MCNC: 150 MG/DL
TSH SERPL-ACNC: 1.58 UIU/ML
UROBILINOGEN URINE: 0.2 MG/DL
WBC # FLD AUTO: 11.99 K/UL

## 2024-12-30 LAB
DEPRECATED KAPPA LC FREE/LAMBDA SER: 1.71 RATIO
IGA SER QL IEP: 262 MG/DL
IGG SER QL IEP: 1298 MG/DL
IGM SER QL IEP: 142 MG/DL
KAPPA LC CSF-MCNC: 1.26 MG/DL
KAPPA LC SERPL-MCNC: 2.15 MG/DL

## 2024-12-31 ENCOUNTER — APPOINTMENT (OUTPATIENT)
Dept: GASTROENTEROLOGY | Facility: CLINIC | Age: 49
End: 2024-12-31

## 2025-01-01 LAB
ALBUMIN MFR SERPL ELPH: 60.8 %
ALBUMIN SERPL-MCNC: 4.6 G/DL
ALBUMIN/GLOB SERPL: 1.5 RATIO
ALPHA1 GLOB MFR SERPL ELPH: 3 %
ALPHA1 GLOB SERPL ELPH-MCNC: 0.2 G/DL
ALPHA2 GLOB MFR SERPL ELPH: 7.2 %
ALPHA2 GLOB SERPL ELPH-MCNC: 0.5 G/DL
B-GLOBULIN MFR SERPL ELPH: 11.7 %
B-GLOBULIN SERPL ELPH-MCNC: 0.9 G/DL
GAMMA GLOB FLD ELPH-MCNC: 1.3 G/DL
GAMMA GLOB MFR SERPL ELPH: 17.3 %
INTERPRETATION SERPL IEP-IMP: NORMAL
M PROTEIN MFR SERPL ELPH: 3.4 %
M PROTEIN SPEC IFE-MCNC: NORMAL
MONOCLON BAND OBS SERPL: 0.3 G/DL
PROT SERPL-MCNC: 7.6 G/DL
PROT SERPL-MCNC: 7.6 G/DL

## 2025-01-06 ENCOUNTER — NON-APPOINTMENT (OUTPATIENT)
Age: 50
End: 2025-01-06

## 2025-01-23 ENCOUNTER — APPOINTMENT (OUTPATIENT)
Dept: INTERNAL MEDICINE | Facility: CLINIC | Age: 50
End: 2025-01-23
Payer: COMMERCIAL

## 2025-01-23 VITALS
RESPIRATION RATE: 14 BRPM | SYSTOLIC BLOOD PRESSURE: 122 MMHG | HEART RATE: 72 BPM | WEIGHT: 145 LBS | DIASTOLIC BLOOD PRESSURE: 76 MMHG | HEIGHT: 64 IN | BODY MASS INDEX: 24.75 KG/M2

## 2025-01-23 DIAGNOSIS — M46.1 SACROILIITIS, NOT ELSEWHERE CLASSIFIED: ICD-10-CM

## 2025-01-23 DIAGNOSIS — R93.7 ABNORMAL FINDINGS ON DIAGNOSTIC IMAGING OF OTHER PARTS OF MUSCULOSKELETAL SYSTEM: ICD-10-CM

## 2025-01-23 PROCEDURE — G2211 COMPLEX E/M VISIT ADD ON: CPT

## 2025-01-23 PROCEDURE — 99213 OFFICE O/P EST LOW 20 MIN: CPT

## 2025-02-11 ENCOUNTER — OUTPATIENT (OUTPATIENT)
Dept: OUTPATIENT SERVICES | Facility: HOSPITAL | Age: 50
LOS: 1 days | Discharge: HOME | End: 2025-02-11

## 2025-02-11 DIAGNOSIS — R79.9 ABNORMAL FINDING OF BLOOD CHEMISTRY, UNSPECIFIED: ICD-10-CM

## 2025-02-12 ENCOUNTER — NON-APPOINTMENT (OUTPATIENT)
Age: 50
End: 2025-02-12

## 2025-02-12 ENCOUNTER — TRANSCRIPTION ENCOUNTER (OUTPATIENT)
Age: 50
End: 2025-02-12

## 2025-02-12 ENCOUNTER — RESULT REVIEW (OUTPATIENT)
Age: 50
End: 2025-02-12

## 2025-02-12 ENCOUNTER — APPOINTMENT (OUTPATIENT)
Dept: HEMATOLOGY ONCOLOGY | Facility: CLINIC | Age: 50
End: 2025-02-12
Payer: COMMERCIAL

## 2025-02-12 VITALS
HEART RATE: 78 BPM | DIASTOLIC BLOOD PRESSURE: 82 MMHG | BODY MASS INDEX: 25.24 KG/M2 | WEIGHT: 147.05 LBS | OXYGEN SATURATION: 99 % | SYSTOLIC BLOOD PRESSURE: 142 MMHG | TEMPERATURE: 97.3 F | RESPIRATION RATE: 16 BRPM

## 2025-02-12 DIAGNOSIS — D47.2 MONOCLONAL GAMMOPATHY: ICD-10-CM

## 2025-02-12 DIAGNOSIS — Z00.00 ENCOUNTER FOR GENERAL ADULT MEDICAL EXAMINATION W/OUT ABNORMAL FINDINGS: ICD-10-CM

## 2025-02-12 DIAGNOSIS — D75.1 SECONDARY POLYCYTHEMIA: ICD-10-CM

## 2025-02-12 LAB
BASOPHILS # BLD AUTO: 0.08 K/UL — SIGNIFICANT CHANGE UP (ref 0–0.2)
BASOPHILS NFR BLD AUTO: 0.6 % — SIGNIFICANT CHANGE UP (ref 0–2)
EOSINOPHIL # BLD AUTO: 0.17 K/UL — SIGNIFICANT CHANGE UP (ref 0–0.5)
EOSINOPHIL NFR BLD AUTO: 1.3 % — SIGNIFICANT CHANGE UP (ref 0–6)
HCT VFR BLD CALC: 48.2 % — SIGNIFICANT CHANGE UP (ref 39–50)
HGB BLD-MCNC: 16.3 G/DL — SIGNIFICANT CHANGE UP (ref 13–17)
IMM GRANULOCYTES NFR BLD AUTO: 0.4 % — SIGNIFICANT CHANGE UP (ref 0–0.9)
LYMPHOCYTES # BLD AUTO: 1.6 K/UL — SIGNIFICANT CHANGE UP (ref 1–3.3)
LYMPHOCYTES # BLD AUTO: 12.6 % — LOW (ref 13–44)
MCHC RBC-ENTMCNC: 31.8 PG — SIGNIFICANT CHANGE UP (ref 27–34)
MCHC RBC-ENTMCNC: 33.8 G/DL — SIGNIFICANT CHANGE UP (ref 32–36)
MCV RBC AUTO: 94 FL — SIGNIFICANT CHANGE UP (ref 80–100)
MONOCYTES # BLD AUTO: 0.64 K/UL — SIGNIFICANT CHANGE UP (ref 0–0.9)
MONOCYTES NFR BLD AUTO: 5 % — SIGNIFICANT CHANGE UP (ref 2–14)
NEUTROPHILS # BLD AUTO: 10.2 K/UL — HIGH (ref 1.8–7.4)
NEUTROPHILS NFR BLD AUTO: 80.1 % — HIGH (ref 43–77)
NRBC BLD AUTO-RTO: 0 /100 WBCS — SIGNIFICANT CHANGE UP (ref 0–0)
PLATELET # BLD AUTO: 378 K/UL — SIGNIFICANT CHANGE UP (ref 150–400)
RBC # BLD: 5.13 M/UL — SIGNIFICANT CHANGE UP (ref 4.2–5.8)
RBC # FLD: 14.2 % — SIGNIFICANT CHANGE UP (ref 10.3–14.5)
WBC # BLD: 12.74 K/UL — HIGH (ref 3.8–10.5)
WBC # FLD AUTO: 12.74 K/UL — HIGH (ref 3.8–10.5)

## 2025-02-12 PROCEDURE — 99417 PROLNG OP E/M EACH 15 MIN: CPT

## 2025-02-12 PROCEDURE — 99245 OFF/OP CONSLTJ NEW/EST HI 55: CPT

## 2025-02-13 LAB
B2 MICROGLOB SERPL-MCNC: 1.9 MG/L
COHGB MFR BLDV: 1.6 %
CRP SERPL-MCNC: <3 MG/L
GAS + CO PNL BLD: NORMAL
HGB BLD-MCNC: 17.8 G/DL
O2HB: 69.3 %

## 2025-02-21 LAB
CRYOGLOB SERPL-MCNC: NEGATIVE
JAK2 P.V617F BLD/T QL: NORMAL
REFLEX:: NORMAL

## 2025-02-24 DIAGNOSIS — D45 POLYCYTHEMIA VERA: ICD-10-CM

## 2025-02-27 ENCOUNTER — RESULT REVIEW (OUTPATIENT)
Age: 50
End: 2025-02-27

## 2025-02-27 ENCOUNTER — APPOINTMENT (OUTPATIENT)
Dept: HEMATOLOGY ONCOLOGY | Facility: CLINIC | Age: 50
End: 2025-02-27

## 2025-02-27 ENCOUNTER — APPOINTMENT (OUTPATIENT)
Dept: INFUSION THERAPY | Facility: HOSPITAL | Age: 50
End: 2025-02-27

## 2025-02-27 LAB
BASOPHILS # BLD AUTO: 0.07 K/UL — SIGNIFICANT CHANGE UP (ref 0–0.2)
BASOPHILS NFR BLD AUTO: 0.6 % — SIGNIFICANT CHANGE UP (ref 0–2)
EOSINOPHIL # BLD AUTO: 0.25 K/UL — SIGNIFICANT CHANGE UP (ref 0–0.5)
EOSINOPHIL NFR BLD AUTO: 2 % — SIGNIFICANT CHANGE UP (ref 0–6)
HCT VFR BLD CALC: 47.6 % — SIGNIFICANT CHANGE UP (ref 39–50)
HGB BLD-MCNC: 16.5 G/DL — SIGNIFICANT CHANGE UP (ref 13–17)
IMM GRANULOCYTES NFR BLD AUTO: 0.7 % — SIGNIFICANT CHANGE UP (ref 0–0.9)
LYMPHOCYTES # BLD AUTO: 1.57 K/UL — SIGNIFICANT CHANGE UP (ref 1–3.3)
LYMPHOCYTES # BLD AUTO: 12.6 % — LOW (ref 13–44)
MCHC RBC-ENTMCNC: 31.9 PG — SIGNIFICANT CHANGE UP (ref 27–34)
MCHC RBC-ENTMCNC: 34.7 G/DL — SIGNIFICANT CHANGE UP (ref 32–36)
MCV RBC AUTO: 92.1 FL — SIGNIFICANT CHANGE UP (ref 80–100)
MONOCYTES # BLD AUTO: 0.47 K/UL — SIGNIFICANT CHANGE UP (ref 0–0.9)
MONOCYTES NFR BLD AUTO: 3.8 % — SIGNIFICANT CHANGE UP (ref 2–14)
NEUTROPHILS # BLD AUTO: 10.03 K/UL — HIGH (ref 1.8–7.4)
NEUTROPHILS NFR BLD AUTO: 80.3 % — HIGH (ref 43–77)
NRBC BLD AUTO-RTO: 0 /100 WBCS — SIGNIFICANT CHANGE UP (ref 0–0)
PLATELET # BLD AUTO: 402 K/UL — HIGH (ref 150–400)
RBC # BLD: 5.17 M/UL — SIGNIFICANT CHANGE UP (ref 4.2–5.8)
RBC # FLD: 14.4 % — SIGNIFICANT CHANGE UP (ref 10.3–14.5)
WBC # BLD: 12.48 K/UL — HIGH (ref 3.8–10.5)
WBC # FLD AUTO: 12.48 K/UL — HIGH (ref 3.8–10.5)

## 2025-02-28 ENCOUNTER — INPATIENT (INPATIENT)
Facility: HOSPITAL | Age: 50
LOS: 0 days | Discharge: HOME | DRG: 390 | End: 2025-03-01
Attending: STUDENT IN AN ORGANIZED HEALTH CARE EDUCATION/TRAINING PROGRAM | Admitting: STUDENT IN AN ORGANIZED HEALTH CARE EDUCATION/TRAINING PROGRAM
Payer: COMMERCIAL

## 2025-02-28 VITALS
OXYGEN SATURATION: 99 % | HEIGHT: 65 IN | WEIGHT: 145.95 LBS | DIASTOLIC BLOOD PRESSURE: 89 MMHG | RESPIRATION RATE: 18 BRPM | HEART RATE: 94 BPM | SYSTOLIC BLOOD PRESSURE: 152 MMHG | TEMPERATURE: 98 F

## 2025-02-28 DIAGNOSIS — D45 POLYCYTHEMIA VERA: ICD-10-CM

## 2025-02-28 DIAGNOSIS — K56.609 UNSPECIFIED INTESTINAL OBSTRUCTION, UNSPECIFIED AS TO PARTIAL VERSUS COMPLETE OBSTRUCTION: ICD-10-CM

## 2025-02-28 PROBLEM — K27.9 PEPTIC ULCER, SITE UNSPECIFIED, UNSPECIFIED AS ACUTE OR CHRONIC, WITHOUT HEMORRHAGE OR PERFORATION: Chronic | Status: ACTIVE | Noted: 2025-02-26

## 2025-02-28 LAB
ALBUMIN SERPL ELPH-MCNC: 4.1 G/DL — SIGNIFICANT CHANGE UP (ref 3.3–5)
ALP SERPL-CCNC: 94 U/L — SIGNIFICANT CHANGE UP (ref 40–120)
ALT FLD-CCNC: 26 U/L — SIGNIFICANT CHANGE UP (ref 10–45)
ANION GAP SERPL CALC-SCNC: 11 MMOL/L — SIGNIFICANT CHANGE UP (ref 5–17)
AST SERPL-CCNC: 40 U/L — SIGNIFICANT CHANGE UP (ref 10–40)
BASOPHILS # BLD AUTO: 0.04 K/UL — SIGNIFICANT CHANGE UP (ref 0–0.2)
BASOPHILS NFR BLD AUTO: 0.2 % — SIGNIFICANT CHANGE UP (ref 0–2)
BILIRUB SERPL-MCNC: 0.7 MG/DL — SIGNIFICANT CHANGE UP (ref 0.2–1.2)
BUN SERPL-MCNC: 13 MG/DL — SIGNIFICANT CHANGE UP (ref 7–23)
CALCIUM SERPL-MCNC: 9.1 MG/DL — SIGNIFICANT CHANGE UP (ref 8.4–10.5)
CHLORIDE SERPL-SCNC: 103 MMOL/L — SIGNIFICANT CHANGE UP (ref 96–108)
CO2 SERPL-SCNC: 26 MMOL/L — SIGNIFICANT CHANGE UP (ref 22–31)
CREAT SERPL-MCNC: 0.86 MG/DL — SIGNIFICANT CHANGE UP (ref 0.5–1.3)
EGFR: 105 ML/MIN/1.73M2 — SIGNIFICANT CHANGE UP
EGFR: 105 ML/MIN/1.73M2 — SIGNIFICANT CHANGE UP
EOSINOPHIL # BLD AUTO: 0.05 K/UL — SIGNIFICANT CHANGE UP (ref 0–0.5)
EOSINOPHIL NFR BLD AUTO: 0.3 % — SIGNIFICANT CHANGE UP (ref 0–6)
GAS PNL BLDV: SIGNIFICANT CHANGE UP
GLUCOSE SERPL-MCNC: 113 MG/DL — HIGH (ref 70–99)
HCT VFR BLD CALC: 46.1 % — SIGNIFICANT CHANGE UP (ref 39–50)
HGB BLD-MCNC: 15.3 G/DL — SIGNIFICANT CHANGE UP (ref 13–17)
IMM GRANULOCYTES NFR BLD AUTO: 1.1 % — HIGH (ref 0–0.9)
LIDOCAIN IGE QN: 41 U/L — SIGNIFICANT CHANGE UP (ref 7–60)
LYMPHOCYTES # BLD AUTO: 0.91 K/UL — LOW (ref 1–3.3)
LYMPHOCYTES # BLD AUTO: 5.1 % — LOW (ref 13–44)
MCHC RBC-ENTMCNC: 30.9 PG — SIGNIFICANT CHANGE UP (ref 27–34)
MCHC RBC-ENTMCNC: 33.2 G/DL — SIGNIFICANT CHANGE UP (ref 32–36)
MCV RBC AUTO: 93.1 FL — SIGNIFICANT CHANGE UP (ref 80–100)
MONOCYTES # BLD AUTO: 0.58 K/UL — SIGNIFICANT CHANGE UP (ref 0–0.9)
MONOCYTES NFR BLD AUTO: 3.2 % — SIGNIFICANT CHANGE UP (ref 2–14)
NEUTROPHILS # BLD AUTO: 16.08 K/UL — HIGH (ref 1.8–7.4)
NEUTROPHILS NFR BLD AUTO: 90.1 % — HIGH (ref 43–77)
NRBC BLD AUTO-RTO: 0 /100 WBCS — SIGNIFICANT CHANGE UP (ref 0–0)
PLATELET # BLD AUTO: 423 K/UL — HIGH (ref 150–400)
POTASSIUM SERPL-MCNC: 5 MMOL/L — SIGNIFICANT CHANGE UP (ref 3.5–5.3)
POTASSIUM SERPL-SCNC: 5 MMOL/L — SIGNIFICANT CHANGE UP (ref 3.5–5.3)
PROT SERPL-MCNC: 7.3 G/DL — SIGNIFICANT CHANGE UP (ref 6–8.3)
RBC # BLD: 4.95 M/UL — SIGNIFICANT CHANGE UP (ref 4.2–5.8)
RBC # FLD: 14.3 % — SIGNIFICANT CHANGE UP (ref 10.3–14.5)
SODIUM SERPL-SCNC: 140 MMOL/L — SIGNIFICANT CHANGE UP (ref 135–145)
WBC # BLD: 17.85 K/UL — HIGH (ref 3.8–10.5)
WBC # FLD AUTO: 17.85 K/UL — HIGH (ref 3.8–10.5)

## 2025-02-28 PROCEDURE — 99285 EMERGENCY DEPT VISIT HI MDM: CPT

## 2025-02-28 PROCEDURE — 71045 X-RAY EXAM CHEST 1 VIEW: CPT | Mod: 26

## 2025-02-28 PROCEDURE — 99222 1ST HOSP IP/OBS MODERATE 55: CPT

## 2025-02-28 PROCEDURE — 71045 X-RAY EXAM CHEST 1 VIEW: CPT | Mod: 26,77

## 2025-02-28 PROCEDURE — 74177 CT ABD & PELVIS W/CONTRAST: CPT | Mod: 26

## 2025-02-28 RX ORDER — ACETAMINOPHEN 500 MG/5ML
1000 LIQUID (ML) ORAL ONCE
Refills: 0 | Status: COMPLETED | OUTPATIENT
Start: 2025-02-28 | End: 2025-02-28

## 2025-02-28 RX ORDER — SODIUM CHLORIDE 9 G/1000ML
1000 INJECTION, SOLUTION INTRAVENOUS
Refills: 0 | Status: DISCONTINUED | OUTPATIENT
Start: 2025-02-28 | End: 2025-03-01

## 2025-02-28 RX ORDER — INFLUENZA A VIRUS A/IDAHO/07/2018 (H1N1) ANTIGEN (MDCK CELL DERIVED, PROPIOLACTONE INACTIVATED, INFLUENZA A VIRUS A/INDIANA/08/2018 (H3N2) ANTIGEN (MDCK CELL DERIVED, PROPIOLACTONE INACTIVATED), INFLUENZA B VIRUS B/SINGAPORE/INFTT-16-0610/2016 ANTIGEN (MDCK CELL DERIVED, PROPIOLACTONE INACTIVATED), INFLUENZA B VIRUS B/IOWA/06/2017 ANTIGEN (MDCK CELL DERIVED, PROPIOLACTONE INACTIVATED) 15; 15; 15; 15 UG/.5ML; UG/.5ML; UG/.5ML; UG/.5ML
0.5 INJECTION, SUSPENSION INTRAMUSCULAR ONCE
Refills: 0 | Status: DISCONTINUED | OUTPATIENT
Start: 2025-02-28 | End: 2025-03-01

## 2025-02-28 RX ORDER — BENZOCAINE 220 MG/G
1 SPRAY, METERED PERIODONTAL
Refills: 0 | Status: DISCONTINUED | OUTPATIENT
Start: 2025-02-28 | End: 2025-03-01

## 2025-02-28 RX ORDER — HEPARIN SODIUM 1000 [USP'U]/ML
5000 INJECTION INTRAVENOUS; SUBCUTANEOUS EVERY 8 HOURS
Refills: 0 | Status: DISCONTINUED | OUTPATIENT
Start: 2025-02-28 | End: 2025-03-01

## 2025-02-28 RX ADMIN — Medication 1000 MILLIGRAM(S): at 12:30

## 2025-02-28 RX ADMIN — Medication 400 MILLIGRAM(S): at 11:32

## 2025-02-28 RX ADMIN — Medication 1000 MILLIGRAM(S): at 11:50

## 2025-02-28 RX ADMIN — HEPARIN SODIUM 5000 UNIT(S): 1000 INJECTION INTRAVENOUS; SUBCUTANEOUS at 21:31

## 2025-02-28 RX ADMIN — Medication 20 MILLIGRAM(S): at 11:32

## 2025-02-28 RX ADMIN — Medication 1000 MILLILITER(S): at 11:55

## 2025-02-28 RX ADMIN — Medication 400 MILLIGRAM(S): at 21:30

## 2025-02-28 RX ADMIN — Medication 1000 MILLILITER(S): at 10:55

## 2025-03-01 ENCOUNTER — TRANSCRIPTION ENCOUNTER (OUTPATIENT)
Age: 50
End: 2025-03-01

## 2025-03-01 VITALS
RESPIRATION RATE: 18 BRPM | OXYGEN SATURATION: 96 % | HEART RATE: 71 BPM | TEMPERATURE: 98 F | SYSTOLIC BLOOD PRESSURE: 128 MMHG | DIASTOLIC BLOOD PRESSURE: 77 MMHG

## 2025-03-01 LAB
ANION GAP SERPL CALC-SCNC: 11 MMOL/L — SIGNIFICANT CHANGE UP (ref 5–17)
BUN SERPL-MCNC: 11 MG/DL — SIGNIFICANT CHANGE UP (ref 7–23)
CALCIUM SERPL-MCNC: 8.9 MG/DL — SIGNIFICANT CHANGE UP (ref 8.4–10.5)
CHLORIDE SERPL-SCNC: 105 MMOL/L — SIGNIFICANT CHANGE UP (ref 96–108)
CO2 SERPL-SCNC: 25 MMOL/L — SIGNIFICANT CHANGE UP (ref 22–31)
CREAT SERPL-MCNC: 1.02 MG/DL — SIGNIFICANT CHANGE UP (ref 0.5–1.3)
EGFR: 90 ML/MIN/1.73M2 — SIGNIFICANT CHANGE UP
EGFR: 90 ML/MIN/1.73M2 — SIGNIFICANT CHANGE UP
GLUCOSE SERPL-MCNC: 92 MG/DL — SIGNIFICANT CHANGE UP (ref 70–99)
HCT VFR BLD CALC: 45.9 % — SIGNIFICANT CHANGE UP (ref 39–50)
HGB BLD-MCNC: 14.9 G/DL — SIGNIFICANT CHANGE UP (ref 13–17)
MAGNESIUM SERPL-MCNC: 2.1 MG/DL — SIGNIFICANT CHANGE UP (ref 1.6–2.6)
MCHC RBC-ENTMCNC: 31.4 PG — SIGNIFICANT CHANGE UP (ref 27–34)
MCHC RBC-ENTMCNC: 32.5 G/DL — SIGNIFICANT CHANGE UP (ref 32–36)
MCV RBC AUTO: 96.6 FL — SIGNIFICANT CHANGE UP (ref 80–100)
NRBC BLD AUTO-RTO: 0 /100 WBCS — SIGNIFICANT CHANGE UP (ref 0–0)
PHOSPHATE SERPL-MCNC: 3 MG/DL — SIGNIFICANT CHANGE UP (ref 2.5–4.5)
PLATELET # BLD AUTO: 389 K/UL — SIGNIFICANT CHANGE UP (ref 150–400)
POTASSIUM SERPL-MCNC: 4.1 MMOL/L — SIGNIFICANT CHANGE UP (ref 3.5–5.3)
POTASSIUM SERPL-SCNC: 4.1 MMOL/L — SIGNIFICANT CHANGE UP (ref 3.5–5.3)
PROCALCITONIN SERPL-MCNC: 0.06 NG/ML — SIGNIFICANT CHANGE UP (ref 0.02–0.1)
RBC # BLD: 4.75 M/UL — SIGNIFICANT CHANGE UP (ref 4.2–5.8)
RBC # FLD: 14.2 % — SIGNIFICANT CHANGE UP (ref 10.3–14.5)
SODIUM SERPL-SCNC: 141 MMOL/L — SIGNIFICANT CHANGE UP (ref 135–145)
WBC # BLD: 13.09 K/UL — HIGH (ref 3.8–10.5)
WBC # FLD AUTO: 13.09 K/UL — HIGH (ref 3.8–10.5)

## 2025-03-01 PROCEDURE — 82803 BLOOD GASES ANY COMBINATION: CPT

## 2025-03-01 PROCEDURE — 83605 ASSAY OF LACTIC ACID: CPT

## 2025-03-01 PROCEDURE — 82330 ASSAY OF CALCIUM: CPT

## 2025-03-01 PROCEDURE — 83735 ASSAY OF MAGNESIUM: CPT

## 2025-03-01 PROCEDURE — 80048 BASIC METABOLIC PNL TOTAL CA: CPT

## 2025-03-01 PROCEDURE — 85014 HEMATOCRIT: CPT

## 2025-03-01 PROCEDURE — 84295 ASSAY OF SERUM SODIUM: CPT

## 2025-03-01 PROCEDURE — 74177 CT ABD & PELVIS W/CONTRAST: CPT | Mod: MC

## 2025-03-01 PROCEDURE — 99239 HOSP IP/OBS DSCHRG MGMT >30: CPT

## 2025-03-01 PROCEDURE — 96365 THER/PROPH/DIAG IV INF INIT: CPT

## 2025-03-01 PROCEDURE — 96361 HYDRATE IV INFUSION ADD-ON: CPT

## 2025-03-01 PROCEDURE — 83690 ASSAY OF LIPASE: CPT

## 2025-03-01 PROCEDURE — 84145 PROCALCITONIN (PCT): CPT

## 2025-03-01 PROCEDURE — 71045 X-RAY EXAM CHEST 1 VIEW: CPT

## 2025-03-01 PROCEDURE — 82435 ASSAY OF BLOOD CHLORIDE: CPT

## 2025-03-01 PROCEDURE — 85025 COMPLETE CBC W/AUTO DIFF WBC: CPT

## 2025-03-01 PROCEDURE — 84132 ASSAY OF SERUM POTASSIUM: CPT

## 2025-03-01 PROCEDURE — 82947 ASSAY GLUCOSE BLOOD QUANT: CPT

## 2025-03-01 PROCEDURE — 85027 COMPLETE CBC AUTOMATED: CPT

## 2025-03-01 PROCEDURE — 96375 TX/PRO/DX INJ NEW DRUG ADDON: CPT

## 2025-03-01 PROCEDURE — 84100 ASSAY OF PHOSPHORUS: CPT

## 2025-03-01 PROCEDURE — 85018 HEMOGLOBIN: CPT

## 2025-03-01 PROCEDURE — 80053 COMPREHEN METABOLIC PANEL: CPT

## 2025-03-01 PROCEDURE — 99285 EMERGENCY DEPT VISIT HI MDM: CPT | Mod: 25

## 2025-03-03 ENCOUNTER — INPATIENT (INPATIENT)
Facility: HOSPITAL | Age: 50
LOS: 3 days | Discharge: HOME | DRG: 390 | End: 2025-03-07
Attending: SURGERY | Admitting: SURGERY
Payer: COMMERCIAL

## 2025-03-03 VITALS
DIASTOLIC BLOOD PRESSURE: 73 MMHG | WEIGHT: 145.95 LBS | SYSTOLIC BLOOD PRESSURE: 118 MMHG | TEMPERATURE: 98 F | RESPIRATION RATE: 20 BRPM | OXYGEN SATURATION: 98 % | HEIGHT: 65 IN | HEART RATE: 88 BPM

## 2025-03-03 DIAGNOSIS — K56.609 UNSPECIFIED INTESTINAL OBSTRUCTION, UNSPECIFIED AS TO PARTIAL VERSUS COMPLETE OBSTRUCTION: ICD-10-CM

## 2025-03-03 PROBLEM — Z86.2 PERSONAL HISTORY OF DISEASES OF THE BLOOD AND BLOOD-FORMING ORGANS AND CERTAIN DISORDERS INVOLVING THE IMMUNE MECHANISM: Chronic | Status: ACTIVE | Noted: 2025-02-28

## 2025-03-03 LAB
ALBUMIN SERPL ELPH-MCNC: 4.6 G/DL — SIGNIFICANT CHANGE UP (ref 3.3–5)
ALP SERPL-CCNC: 102 U/L — SIGNIFICANT CHANGE UP (ref 40–120)
ALT FLD-CCNC: 30 U/L — SIGNIFICANT CHANGE UP (ref 10–45)
ANION GAP SERPL CALC-SCNC: 15 MMOL/L — SIGNIFICANT CHANGE UP (ref 5–17)
AST SERPL-CCNC: 29 U/L — SIGNIFICANT CHANGE UP (ref 10–40)
BASOPHILS # BLD AUTO: 0.04 K/UL — SIGNIFICANT CHANGE UP (ref 0–0.2)
BASOPHILS NFR BLD AUTO: 0.2 % — SIGNIFICANT CHANGE UP (ref 0–2)
BILIRUB SERPL-MCNC: 0.7 MG/DL — SIGNIFICANT CHANGE UP (ref 0.2–1.2)
BUN SERPL-MCNC: 11 MG/DL — SIGNIFICANT CHANGE UP (ref 7–23)
CALCIUM SERPL-MCNC: 9.9 MG/DL — SIGNIFICANT CHANGE UP (ref 8.4–10.5)
CHLORIDE SERPL-SCNC: 100 MMOL/L — SIGNIFICANT CHANGE UP (ref 96–108)
CO2 SERPL-SCNC: 25 MMOL/L — SIGNIFICANT CHANGE UP (ref 22–31)
CREAT SERPL-MCNC: 0.94 MG/DL — SIGNIFICANT CHANGE UP (ref 0.5–1.3)
EGFR: 99 ML/MIN/1.73M2 — SIGNIFICANT CHANGE UP
EGFR: 99 ML/MIN/1.73M2 — SIGNIFICANT CHANGE UP
EOSINOPHIL # BLD AUTO: 0.09 K/UL — SIGNIFICANT CHANGE UP (ref 0–0.5)
EOSINOPHIL NFR BLD AUTO: 0.5 % — SIGNIFICANT CHANGE UP (ref 0–6)
GAS PNL BLDV: SIGNIFICANT CHANGE UP
GLUCOSE SERPL-MCNC: 140 MG/DL — HIGH (ref 70–99)
HCT VFR BLD CALC: 46.9 % — SIGNIFICANT CHANGE UP (ref 39–50)
HGB BLD-MCNC: 15.7 G/DL — SIGNIFICANT CHANGE UP (ref 13–17)
IMM GRANULOCYTES NFR BLD AUTO: 0.7 % — SIGNIFICANT CHANGE UP (ref 0–0.9)
LIDOCAIN IGE QN: 41 U/L — SIGNIFICANT CHANGE UP (ref 7–60)
LYMPHOCYTES # BLD AUTO: 0.77 K/UL — LOW (ref 1–3.3)
LYMPHOCYTES # BLD AUTO: 4.7 % — LOW (ref 13–44)
MCHC RBC-ENTMCNC: 31.3 PG — SIGNIFICANT CHANGE UP (ref 27–34)
MCHC RBC-ENTMCNC: 33.5 G/DL — SIGNIFICANT CHANGE UP (ref 32–36)
MCV RBC AUTO: 93.4 FL — SIGNIFICANT CHANGE UP (ref 80–100)
MONOCYTES # BLD AUTO: 0.38 K/UL — SIGNIFICANT CHANGE UP (ref 0–0.9)
MONOCYTES NFR BLD AUTO: 2.3 % — SIGNIFICANT CHANGE UP (ref 2–14)
NEUTROPHILS # BLD AUTO: 14.99 K/UL — HIGH (ref 1.8–7.4)
NEUTROPHILS NFR BLD AUTO: 91.6 % — HIGH (ref 43–77)
NRBC BLD AUTO-RTO: 0 /100 WBCS — SIGNIFICANT CHANGE UP (ref 0–0)
PLATELET # BLD AUTO: 440 K/UL — HIGH (ref 150–400)
POTASSIUM SERPL-MCNC: 3.9 MMOL/L — SIGNIFICANT CHANGE UP (ref 3.5–5.3)
POTASSIUM SERPL-SCNC: 3.9 MMOL/L — SIGNIFICANT CHANGE UP (ref 3.5–5.3)
PROT SERPL-MCNC: 7.9 G/DL — SIGNIFICANT CHANGE UP (ref 6–8.3)
RBC # BLD: 5.02 M/UL — SIGNIFICANT CHANGE UP (ref 4.2–5.8)
RBC # FLD: 14.1 % — SIGNIFICANT CHANGE UP (ref 10.3–14.5)
SODIUM SERPL-SCNC: 140 MMOL/L — SIGNIFICANT CHANGE UP (ref 135–145)
WBC # BLD: 16.39 K/UL — HIGH (ref 3.8–10.5)
WBC # FLD AUTO: 16.39 K/UL — HIGH (ref 3.8–10.5)

## 2025-03-03 PROCEDURE — 99053 MED SERV 10PM-8AM 24 HR FAC: CPT

## 2025-03-03 PROCEDURE — 99285 EMERGENCY DEPT VISIT HI MDM: CPT

## 2025-03-03 PROCEDURE — 71045 X-RAY EXAM CHEST 1 VIEW: CPT | Mod: 26

## 2025-03-03 PROCEDURE — 74018 RADEX ABDOMEN 1 VIEW: CPT | Mod: 26

## 2025-03-03 PROCEDURE — 74177 CT ABD & PELVIS W/CONTRAST: CPT | Mod: 26

## 2025-03-03 RX ORDER — SODIUM CHLORIDE 9 G/1000ML
1000 INJECTION, SOLUTION INTRAVENOUS
Refills: 0 | Status: DISCONTINUED | OUTPATIENT
Start: 2025-03-03 | End: 2025-03-04

## 2025-03-03 RX ORDER — ASPIRIN 325 MG
0 TABLET ORAL
Refills: 0 | DISCHARGE

## 2025-03-03 RX ORDER — METOCLOPRAMIDE HCL 10 MG
10 TABLET ORAL ONCE
Refills: 0 | Status: COMPLETED | OUTPATIENT
Start: 2025-03-03 | End: 2025-03-03

## 2025-03-03 RX ORDER — ACETAMINOPHEN 500 MG/5ML
1000 LIQUID (ML) ORAL ONCE
Refills: 0 | Status: COMPLETED | OUTPATIENT
Start: 2025-03-03 | End: 2025-03-03

## 2025-03-03 RX ORDER — ENOXAPARIN SODIUM 100 MG/ML
40 INJECTION SUBCUTANEOUS EVERY 24 HOURS
Refills: 0 | Status: DISCONTINUED | OUTPATIENT
Start: 2025-03-03 | End: 2025-03-05

## 2025-03-03 RX ADMIN — Medication 1000 MILLIGRAM(S): at 04:05

## 2025-03-03 RX ADMIN — Medication 40 MILLIGRAM(S): at 14:55

## 2025-03-03 RX ADMIN — Medication 400 MILLIGRAM(S): at 03:35

## 2025-03-03 RX ADMIN — Medication 10 MILLIGRAM(S): at 03:35

## 2025-03-03 RX ADMIN — ENOXAPARIN SODIUM 40 MILLIGRAM(S): 100 INJECTION SUBCUTANEOUS at 17:49

## 2025-03-03 RX ADMIN — Medication 1000 MILLILITER(S): at 03:35

## 2025-03-03 RX ADMIN — SODIUM CHLORIDE 100 MILLILITER(S): 9 INJECTION, SOLUTION INTRAVENOUS at 17:49

## 2025-03-03 RX ADMIN — SODIUM CHLORIDE 100 MILLILITER(S): 9 INJECTION, SOLUTION INTRAVENOUS at 09:41

## 2025-03-03 NOTE — ED ADULT NURSE NOTE - OBJECTIVE STATEMENT
50 y/o M, pmh of HLD, SBO, H. pylori infection, GERD, polycythemia vera presenting with abdominal pain nausea vomiting via EMS from home.  Patient states he was discharged from the hospital March 1 after concerns of SBO.  States since discharge, has decreased p.o. intake, and today 8 episodes of nonbloody emesis  States his last bowel movement was on the day of discharge.  States he passed gas this morning.  Denies any fevers, chills, chest pain, shortness of breath, urinary complaints.  abd soft, non distended, diffusely tender.

## 2025-03-03 NOTE — H&P ADULT - NSICDXPASTMEDICALHX_GEN_ALL_CORE_FT
PAST MEDICAL HISTORY:  Gastritis     History of polycythemia     HLD (hyperlipidemia)     Peptic ulcer     SBO (small bowel obstruction)

## 2025-03-03 NOTE — H&P ADULT - NSHPPHYSICALEXAM_GEN_ALL_CORE
VITAL SIGNS:  Vital Signs Last 24 Hrs  T(C): 36.6 (03 Mar 2025 07:37), Max: 37 (03 Mar 2025 03:29)  T(F): 97.9 (03 Mar 2025 07:37), Max: 98.6 (03 Mar 2025 03:29)  HR: 99 (03 Mar 2025 07:37) (83 - 99)  BP: 114/62 (03 Mar 2025 07:37) (107/70 - 131/83)  BP(mean): --  RR: 20 (03 Mar 2025 07:37) (17 - 20)  SpO2: 98% (03 Mar 2025 07:37) (96% - 98%)    Parameters below as of 03 Mar 2025 07:37  Patient On (Oxygen Delivery Method): room air          PHYSICAL EXAM:    General: NAD, Sitting in bed comfortably  HEENT: NC/AT  Neck: Soft, supple  Cardio: RRR  Resp: Good effort, room air  GI/Abd: Soft, non-distended, tender to palpation in mid-abdomen, non-peritoneal, no surgical scars seen  Vascular: Extremities warm  Skin: Intact, no breakdown  Lymphatic/Nodes: No palpable lymphadenopathy  Musculoskeletal: All 4 extremities moving spontaneously, no limitations

## 2025-03-03 NOTE — H&P ADULT - ASSESSMENT
49M with PMH of MGUS, HLD, GERD with treated H Pylori infection, polycythemia vera and no PSHx presenting with 1 day of abdominal pain associated with multiple episodes of NBNB emesis. Recent admission and discharged 3/2 for similar symptoms, imaging at that time showing dilated loops of small bowel without discrete transition point. CTAP with PO and IV contrast showing dilated loops of small bowel with more discrete transition point in the left carol-abdomen concerning for SBO.    PLAN:  - Admit to ACS under Dr. Epps  - Repeat AXR to see progression of contrast  - NPO/IVF  - Patient declines NG tube at this time  - Exam before pain meds  - If patient without clinical improvement, will plan for diagnostic laparoscopy    Discussed with Dr. Epps

## 2025-03-03 NOTE — ED PROVIDER NOTE - ATTENDING CONTRIBUTION TO CARE
Violeta Clements DO EM Attending  49-year-old male history of MGUS, hyperlipidemia, H. pylori infection, GERD, polycythemia vera presenting with abdominal pain nausea vomiting.  Patient states he was discharged from the hospital March 1 after concerns of SBO.  States since discharge she has had decreased p.o. intake and then today had 8 episodes of nonbloody nonbilious vomiting.  States his last bowel movement was on the day of discharge.  States he passed gas this morning.  Denies any fevers, chills, chest pain, shortness of breath, urinary complaints.  Denies any medications prior to arrival.    PHYSICAL EXAM:  CONSTITUTIONAL: Well appearing, awake, alert, oriented to person, place, time/situation and in no apparent distress.  HEAD: Atraumatic  EYES: Clear bilaterally, pupils equal, round and reactive to light.  ENMT: Airway patent, Nasal mucosa clear. Mouth with normal mucosa. Uvula is midline.   CARDIAC: Normal rate, regular rhythm. +S1/S2. No murmurs, rubs or gallops.  RESPIRATORY: Breathing unlabored. Breath sounds clear and equal bilaterally.  ABDOMEN:  Soft, diffusely tender particularly in epigastrium, nondistended. No rebound tenderness or guarding.  NEUROLOGICAL: Alert and oriented, no focal deficits, no motor or sensory deficits.   MSK: No clubbing, cyanosis, or edema.   SKIN: Skin warm and dry. No evidence of rashes or lesions.    Vital signs stable, afebrile, not hypoxic.   Plan for basic labs, lipase, CT AP with IV and oral contrast, pain/nausea control and re-assessment.

## 2025-03-03 NOTE — ED PROVIDER NOTE - CLINICAL SUMMARY MEDICAL DECISION MAKING FREE TEXT BOX
49-year-old male history of MGUS, hyperlipidemia, H. pylori infection, GERD, polycythemia vera presenting with abdominal pain nausea vomiting.  Patient states he was discharged from the hospital March 1 after concerns of SBO.  States since discharge she has had decreased p.o. intake and then today had 8 episodes of nonbloody nonbilious vomiting.  States his last bowel movement was on the day of discharge.  States he passed gas this morning.  Denies any fevers, chills, chest pain, shortness of breath, urinary complaints.  Denies any medications prior to arrival.    Jose A Lazaro DO (PGY3)   Physical Exam:    Gen: NAD, AOx3  Head: NCAT  HEENT: EOMI, PEERLA  Lung: CTAB, no respiratory distress, no wheezes/rhonchi/rales B/L  CV: RRR, no murmurs, rubs or gallops  Abd: Diffuse abdominal pain, no guarding.  MSK: no visible deformities, ROM normal in UE/LE, no back pain  Neuro: No focal sensory or motor deficits. Sensation intact to light touch all extremities.  Skin: Warm, well perfused, no rash, no leg swelling  Psych: normal affect, calm    Vital signs stable, afebrile, not hypoxic. Plan for basic labs, lipase, symptomatic control with fluids and antiemetics. 49-year-old male history of MGUS, hyperlipidemia, H. pylori infection, GERD, polycythemia vera presenting with abdominal pain nausea vomiting.  Patient states he was discharged from the hospital March 1 after concerns of SBO.  States since discharge she has had decreased p.o. intake and then today had 8 episodes of nonbloody nonbilious vomiting.  States his last bowel movement was on the day of discharge.  States he passed gas this morning.  Denies any fevers, chills, chest pain, shortness of breath, urinary complaints.  Denies any medications prior to arrival.    Jose A Lazaro DO (PGY3)   Physical Exam:    Gen: NAD, AOx3  Head: NCAT  HEENT: EOMI, PEERLA  Lung: CTAB, no respiratory distress, no wheezes/rhonchi/rales B/L  CV: RRR, no murmurs, rubs or gallops  Abd: Diffuse abdominal pain, no guarding.  MSK: no visible deformities, ROM normal in UE/LE, no back pain  Neuro: No focal sensory or motor deficits. Sensation intact to light touch all extremities.  Skin: Warm, well perfused, no rash, no leg swelling  Psych: normal affect, calm    Vital signs stable, afebrile, not hypoxic. Plan for basic labs, lipase, symptomatic control with fluids and antiemetics.  Differential diagnosis includes but not limited to SBO vs. infectious etiology vs. metabolic derangement

## 2025-03-03 NOTE — ED PROVIDER NOTE - CHIEF COMPLAINT
My findings and recommendations are based on patient's symptoms, eye exam, diagnostic testing, and records. The patient is a 49y Male complaining of abdominal pain.

## 2025-03-03 NOTE — H&P ADULT - NSHPLABSRESULTS_GEN_ALL_CORE
LABS:                        15.7   16.39 )-----------( 440      ( 03 Mar 2025 03:46 )             46.9     03 Mar 2025 03:46    140    |  100    |  11     ----------------------------<  140    3.9     |  25     |  0.94     Ca    9.9        03 Mar 2025 03:46    TPro  7.9    /  Alb  4.6    /  TBili  0.7    /  DBili  x      /  AST  29     /  ALT  30     /  AlkPhos  102    03 Mar 2025 03:46      CAPILLARY BLOOD GLUCOSE            LIVER FUNCTIONS - ( 03 Mar 2025 03:46 )  Alb: 4.6 g/dL / Pro: 7.9 g/dL / ALK PHOS: 102 U/L / ALT: 30 U/L / AST: 29 U/L / GGT: x             Urinalysis Basic - ( 03 Mar 2025 03:46 )    Color: x / Appearance: x / SG: x / pH: x  Gluc: 140 mg/dL / Ketone: x  / Bili: x / Urobili: x   Blood: x / Protein: x / Nitrite: x   Leuk Esterase: x / RBC: x / WBC x   Sq Epi: x / Non Sq Epi: x / Bacteria: x      < from: CT Abdomen and Pelvis w/ Oral Cont and w/ IV Cont (03.03.25 @ 06:39) >    FINDINGS:  LOWER CHEST: Basilar atelectasis.    LIVER: Within normal limits.  BILE DUCTS: Normal caliber.  GALLBLADDER: Within normal limits.  SPLEEN: Within normal limits.  PANCREAS: Within normal limits.  ADRENALS: Within normal limits.  KIDNEYS/URETERS: Within normal limits.    BLADDER: Within normal limits.  REPRODUCTIVE ORGANS: Prostate within normal limits.    BOWEL: Dilated loops of small bowel with single transition point in the   left hemiabdomen (series 6 image 34). Appendix within normal limits.  PERITONEUM/RETROPERITONEUM: Within normal limits.  VESSELS: Within normal limits.  LYMPH NODES: No lymphadenopathy.  ABDOMINAL WALL: Within normal limits.  BONES: Within normal limits.    IMPRESSION:  Dilated loops of small bowel with single transition point in the left   hemiabdomen compatible withsmall bowel obstruction.        --- End of Report ---

## 2025-03-03 NOTE — ED PROVIDER NOTE - PROGRESS NOTE DETAILS
Patient was endorsed to me at change of shift.  CT scan shows a bowel obstruction.  I discussed the case with surgery and they are going to come and see the patient promptly. -Obi Gonsalez MD- Pt seen by surgery team who advised pt accepted to Dr. Epps service  Delmy Garcia PA-C

## 2025-03-03 NOTE — H&P ADULT - HISTORY OF PRESENT ILLNESS
49M with PMH of MGUS, HLD, GERD with treated H Pylori infection, polycythemia vera and no PSHx presenting with 1 day of abdominal pain associated with multiple episodes of NBNB emesis. Patient was recently admitted for the same symptoms and discharged 3/2 with toleration of PO and having bowel function. CTAP at that admission was showing dilated loops of bowel possible partial SBO or ileus with no discrete transition point. Patient reports he went home feeling well but last night had dinner and started feeling abdominal pain which worsened overnight with several episodes of NBNB emesis. Reports last BM was yesterday before pain began and he thinks he is passing gas but can't say for sure. Denies fever, chills.    In ED, patient is afebrile, hemodynamically wnl.  Leukocytosis to 14  Lactate normal at 1.5

## 2025-03-04 LAB
ANION GAP SERPL CALC-SCNC: 14 MMOL/L — SIGNIFICANT CHANGE UP (ref 5–17)
APTT BLD: 32 SEC — SIGNIFICANT CHANGE UP (ref 24.5–35.6)
BASOPHILS # BLD AUTO: 0.06 K/UL — SIGNIFICANT CHANGE UP (ref 0–0.2)
BASOPHILS NFR BLD AUTO: 0.4 % — SIGNIFICANT CHANGE UP (ref 0–2)
BLD GP AB SCN SERPL QL: NEGATIVE — SIGNIFICANT CHANGE UP
BUN SERPL-MCNC: 10 MG/DL — SIGNIFICANT CHANGE UP (ref 7–23)
CALCIUM SERPL-MCNC: 9 MG/DL — SIGNIFICANT CHANGE UP (ref 8.4–10.5)
CHLORIDE SERPL-SCNC: 103 MMOL/L — SIGNIFICANT CHANGE UP (ref 96–108)
CO2 SERPL-SCNC: 25 MMOL/L — SIGNIFICANT CHANGE UP (ref 22–31)
CREAT SERPL-MCNC: 0.97 MG/DL — SIGNIFICANT CHANGE UP (ref 0.5–1.3)
EGFR: 95 ML/MIN/1.73M2 — SIGNIFICANT CHANGE UP
EGFR: 95 ML/MIN/1.73M2 — SIGNIFICANT CHANGE UP
EOSINOPHIL # BLD AUTO: 0.17 K/UL — SIGNIFICANT CHANGE UP (ref 0–0.5)
EOSINOPHIL NFR BLD AUTO: 1.3 % — SIGNIFICANT CHANGE UP (ref 0–6)
GLUCOSE SERPL-MCNC: 56 MG/DL — LOW (ref 70–99)
HCT VFR BLD CALC: 45.2 % — SIGNIFICANT CHANGE UP (ref 39–50)
HCT VFR BLD CALC: 45.4 % — SIGNIFICANT CHANGE UP (ref 39–50)
HGB BLD-MCNC: 14.8 G/DL — SIGNIFICANT CHANGE UP (ref 13–17)
HGB BLD-MCNC: 14.9 G/DL — SIGNIFICANT CHANGE UP (ref 13–17)
IMM GRANULOCYTES NFR BLD AUTO: 0.9 % — SIGNIFICANT CHANGE UP (ref 0–0.9)
INR BLD: 1.09 RATIO — SIGNIFICANT CHANGE UP (ref 0.85–1.16)
LYMPHOCYTES # BLD AUTO: 1.46 K/UL — SIGNIFICANT CHANGE UP (ref 1–3.3)
LYMPHOCYTES # BLD AUTO: 10.7 % — LOW (ref 13–44)
MAGNESIUM SERPL-MCNC: 2.1 MG/DL — SIGNIFICANT CHANGE UP (ref 1.6–2.6)
MCHC RBC-ENTMCNC: 30.5 PG — SIGNIFICANT CHANGE UP (ref 27–34)
MCHC RBC-ENTMCNC: 31.6 PG — SIGNIFICANT CHANGE UP (ref 27–34)
MCHC RBC-ENTMCNC: 32.6 G/DL — SIGNIFICANT CHANGE UP (ref 32–36)
MCHC RBC-ENTMCNC: 33 G/DL — SIGNIFICANT CHANGE UP (ref 32–36)
MCV RBC AUTO: 93.4 FL — SIGNIFICANT CHANGE UP (ref 80–100)
MCV RBC AUTO: 95.8 FL — SIGNIFICANT CHANGE UP (ref 80–100)
MONOCYTES # BLD AUTO: 0.55 K/UL — SIGNIFICANT CHANGE UP (ref 0–0.9)
MONOCYTES NFR BLD AUTO: 4 % — SIGNIFICANT CHANGE UP (ref 2–14)
NEUTROPHILS # BLD AUTO: 11.23 K/UL — HIGH (ref 1.8–7.4)
NEUTROPHILS NFR BLD AUTO: 82.7 % — HIGH (ref 43–77)
NRBC BLD AUTO-RTO: 0 /100 WBCS — SIGNIFICANT CHANGE UP (ref 0–0)
NRBC BLD AUTO-RTO: 0 /100 WBCS — SIGNIFICANT CHANGE UP (ref 0–0)
PHOSPHATE SERPL-MCNC: 3.4 MG/DL — SIGNIFICANT CHANGE UP (ref 2.5–4.5)
PLATELET # BLD AUTO: 355 K/UL — SIGNIFICANT CHANGE UP (ref 150–400)
PLATELET # BLD AUTO: 392 K/UL — SIGNIFICANT CHANGE UP (ref 150–400)
POTASSIUM SERPL-MCNC: 3.9 MMOL/L — SIGNIFICANT CHANGE UP (ref 3.5–5.3)
POTASSIUM SERPL-SCNC: 3.9 MMOL/L — SIGNIFICANT CHANGE UP (ref 3.5–5.3)
PROTHROM AB SERPL-ACNC: 12.4 SEC — SIGNIFICANT CHANGE UP (ref 9.9–13.4)
RBC # BLD: 4.72 M/UL — SIGNIFICANT CHANGE UP (ref 4.2–5.8)
RBC # BLD: 4.86 M/UL — SIGNIFICANT CHANGE UP (ref 4.2–5.8)
RBC # FLD: 14.1 % — SIGNIFICANT CHANGE UP (ref 10.3–14.5)
RBC # FLD: 14.2 % — SIGNIFICANT CHANGE UP (ref 10.3–14.5)
RH IG SCN BLD-IMP: POSITIVE — SIGNIFICANT CHANGE UP
SODIUM SERPL-SCNC: 142 MMOL/L — SIGNIFICANT CHANGE UP (ref 135–145)
WBC # BLD: 10.79 K/UL — HIGH (ref 3.8–10.5)
WBC # BLD: 13.59 K/UL — HIGH (ref 3.8–10.5)
WBC # FLD AUTO: 10.79 K/UL — HIGH (ref 3.8–10.5)
WBC # FLD AUTO: 13.59 K/UL — HIGH (ref 3.8–10.5)

## 2025-03-04 PROCEDURE — 99255 IP/OBS CONSLTJ NEW/EST HI 80: CPT | Mod: GC

## 2025-03-04 RX ORDER — ASPIRIN 325 MG
81 TABLET ORAL DAILY
Refills: 0 | Status: DISCONTINUED | OUTPATIENT
Start: 2025-03-04 | End: 2025-03-05

## 2025-03-04 RX ORDER — POTASSIUM CHLORIDE, DEXTROSE MONOHYDRATE AND SODIUM CHLORIDE 150; 5; 900 MG/100ML; G/100ML; MG/100ML
1000 INJECTION, SOLUTION INTRAVENOUS
Refills: 0 | Status: DISCONTINUED | OUTPATIENT
Start: 2025-03-04 | End: 2025-03-05

## 2025-03-04 RX ADMIN — POTASSIUM CHLORIDE, DEXTROSE MONOHYDRATE AND SODIUM CHLORIDE 100 MILLILITER(S): 150; 5; 900 INJECTION, SOLUTION INTRAVENOUS at 17:52

## 2025-03-04 RX ADMIN — Medication 81 MILLIGRAM(S): at 16:18

## 2025-03-04 RX ADMIN — Medication 40 MILLIGRAM(S): at 12:06

## 2025-03-04 RX ADMIN — ENOXAPARIN SODIUM 40 MILLIGRAM(S): 100 INJECTION SUBCUTANEOUS at 17:51

## 2025-03-04 NOTE — PROGRESS NOTE ADULT - SUBJECTIVE AND OBJECTIVE BOX
ACS Progress Note     Subjective: feeling better, no n/v, passing gas, no BM       Vital Signs Last 24 Hrs  T(C): 36.6 (04 Mar 2025 04:42), Max: 36.9 (03 Mar 2025 20:50)  T(F): 97.8 (04 Mar 2025 04:42), Max: 98.5 (03 Mar 2025 20:50)  HR: 57 (04 Mar 2025 04:42) (57 - 68)  BP: 120/68 (04 Mar 2025 04:42) (110/68 - 121/74)  BP(mean): 82 (03 Mar 2025 14:59) (82 - 82)  RR: 18 (04 Mar 2025 04:42) (18 - 19)  SpO2: 98% (04 Mar 2025 04:42) (97% - 100%)    Parameters below as of 04 Mar 2025 04:42  Patient On (Oxygen Delivery Method): room air        I&O's Detail    03 Mar 2025 07:01  -  04 Mar 2025 07:00  --------------------------------------------------------  IN:    Lactated Ringers: 1200 mL  Total IN: 1200 mL    OUT:    Oral Fluid: 0 mL    Voided (mL): 800 mL  Total OUT: 800 mL    Total NET: 400 mL        PE  NAD AAOX3  chest equal chest rise and fall non labored breathing   abd soft nontender     MEDICATIONS  (STANDING):  enoxaparin Injectable 40 milliGRAM(s) SubCutaneous every 24 hours  lactated ringers. 1000 milliLiter(s) (100 mL/Hr) IV Continuous <Continuous>  pantoprazole  Injectable 40 milliGRAM(s) IV Push daily    MEDICATIONS  (PRN):      LABS:                        15.7   16.39 )-----------( 440      ( 03 Mar 2025 03:46 )             46.9     03-03    140  |  100  |  11  ----------------------------<  140[H]  3.9   |  25  |  0.94    Ca    9.9      03 Mar 2025 03:46    TPro  7.9  /  Alb  4.6  /  TBili  0.7  /  DBili  x   /  AST  29  /  ALT  30  /  AlkPhos  102  03-03          RADIOLOGY & ADDITIONAL STUDIES:

## 2025-03-04 NOTE — CONSULT NOTE ADULT - ASSESSMENT
49M with PMH of MGUS, HLD, GERD with treated H Pylori infection, polycythemia vera and no PSHx presenting with 1 day of abdominal pain associated with multiple episodes of NBNB emesis. CTAP with PO and IV contrast showing dilated loops of small bowel with more discrete transition point in the left carol-abdomen concerning for SBO. Hematology consulted for management of PV.    CBC today (3/4/25):  WBC 10.8, Hgb 14.9,   CBC (3/3/25): WBC 16.4, Hgb 15.7,     # JAK2+ PV  # IgG Kappa MGUS  - Laboratory evaluation beginning in December,2024 revealed white blood cell count 12,000, hemoglobin 16.7, hematocrit 15.9, platelets 403,000, normal differential, normal CMP, . Serum protein electrophoresis revealed an M-spike 0.3 with a gamma-migrating paraprotein shown to be IgG kappa. Quantitative IgG 1,298, IgA 262, IgM 142 and serum free light chain ratio 1.71. Urine protein electrophoresis and immunoelectrophoresis were normal. Erythropoietin level was 1.4.  - No bone lesions on MRI lumbar spine. There is a small risk ,1% annually, of transformation to non-Hodgkin lymphoma or multiple myeloma. Currently on surveillance for MGUS.  - JAK2+ consistent with polycythemia vera. Currently on aspirin 81 mg daily  - Phlebotomize 450 ml to keep hgb <15    Recommendations:  - Will discuss with Dr. Nichole whether to phlebotomize patient today. Per outpatient notes, Hgb < 15 is the goal to phlebotomize 450 cc. However, labs today have Hgb < 14.9.   - Can hold aspirin for a procedure and restart after procedure and on discharge  - CBC with diff daily  - Rest of care per primary team  - Will set up with Dr. Nichole at [facility] upon discharge    **For any questions please check [facility] on call or [facility] amion schedule for heme fellow on call.    Please teams with any additional questions.  Hematology will continue to follow with you.    NOTE INCOMPLETE UNTIL ATTENDING SIGNS    ***************************************************************  Emmanuelle Hernandes, PGY5  Fellow Hematology/Oncology  pager: [phone]   Available on [employer] Teams  After 5pm or on weekends please contact  to page on-call fellow   ***************************************************************   49M with PMH of MGUS, HLD, GERD with treated H Pylori infection, polycythemia vera and no PSHx presenting with 1 day of abdominal pain associated with multiple episodes of NBNB emesis. CTAP with PO and IV contrast showing dilated loops of small bowel with more discrete transition point in the left carol-abdomen concerning for SBO. Hematology consulted for management of PV.    CBC today (3/4/25):  WBC 10.8, Hgb 14.9,   CBC (3/3/25): WBC 16.4, Hgb 15.7,     # JAK2+ PV  # IgG Kappa MGUS  - Laboratory evaluation beginning in December,2024 revealed white blood cell count 12,000, hemoglobin 16.7, hematocrit 15.9, platelets 403,000, normal differential, normal CMP, . Serum protein electrophoresis revealed an M-spike 0.3 with a gamma-migrating paraprotein shown to be IgG kappa. Quantitative IgG 1,298, IgA 262, IgM 142 and serum free light chain ratio 1.71. Urine protein electrophoresis and immunoelectrophoresis were normal. Erythropoietin level was 1.4.  - No bone lesions on MRI lumbar spine. There is a small risk ,1% annually, of transformation to non-Hodgkin lymphoma or multiple myeloma. Currently on surveillance for MGUS.  - JAK2+ consistent with polycythemia vera. Currently on aspirin 81 mg daily  - Phlebotomize 450 ml to keep hgb <15    Recommendations:  - Dr. Nichole recommended to repeat CBC today. Per outpatient notes, Hgb < 15 is the goal to phlebotomize 450 cc. However, labs today have Hgb < 14.9 and yesterday Hgb 15.7. If repeat CBC has Hgb < 15 no need to phlebotomize. However, Hgb > 15, please call blood bank to perform phlebotomy removing 450 cc.   - Can hold aspirin for a procedure and restart after procedure and on discharge  - CBC with diff daily  - Rest of care per primary team  - Will set up with Dr. Nichole at [facility] upon discharge    **For any questions please check [facility] on call or [facility] amion schedule for heme fellow on call.    Please teams with any additional questions.  Hematology will continue to follow with you.    NOTE INCOMPLETE UNTIL ATTENDING SIGNS    ***************************************************************  Emmanuelle Hernandes, PGY5  Fellow Hematology/Oncology  pager: [phone]   Available on [employer] Teams  After 5pm or on weekends please contact  to page on-call fellow   ***************************************************************   49M with PMH of MGUS, HLD, GERD with treated H Pylori infection, polycythemia vera and no PSHx presenting with 1 day of abdominal pain associated with multiple episodes of NBNB emesis. CTAP with PO and IV contrast showing dilated loops of small bowel with more discrete transition point in the left carol-abdomen concerning for SBO. Hematology consulted for management of PV.    CBC today (3/4/25):  WBC 10.8, Hgb 14.9,   CBC (3/3/25): WBC 16.4, Hgb 15.7,     # JAK2+ PV  # IgG Kappa MGUS  - Laboratory evaluation beginning in December,2024 revealed white blood cell count 12,000, hemoglobin 16.7, hematocrit 15.9, platelets 403,000, normal differential, normal CMP, . Serum protein electrophoresis revealed an M-spike 0.3 with a gamma-migrating paraprotein shown to be IgG kappa. Quantitative IgG 1,298, IgA 262, IgM 142 and serum free light chain ratio 1.71. Urine protein electrophoresis and immunoelectrophoresis were normal. Erythropoietin level was 1.4.  - No bone lesions on MRI lumbar spine. There is a small risk ,1% annually, of transformation to non-Hodgkin lymphoma or multiple myeloma. Currently on surveillance for MGUS.  - JAK2+ consistent with polycythemia vera. Currently on aspirin 81 mg daily  - Phlebotomize 450 ml to keep hgb <15    Recommendations:  - Dr. Nichole recommended to repeat CBC today. Per outpatient notes, Hgb < 15 is the goal to phlebotomize 450 cc. However, labs today have Hgb < 14.9 and yesterday Hgb 15.7. If repeat CBC has Hgb < 15 no need to phlebotomize. However, Hgb > 15, please call blood bank to perform phlebotomy removing 450 cc.   - Can hold aspirin for a procedure and restart after procedure and on discharge  - CBC with diff daily  - Please start hep ggt and hold 6h prior to procedure, he is high risk of clotting  - Rest of care per primary team  - Will set up with Dr. Nichole at [facility] upon discharge    **For any questions please check [facility] on call or [facility] amion schedule for heme fellow on call.    Please teams with any additional questions.  Hematology will continue to follow with you.    NOTE INCOMPLETE UNTIL ATTENDING SIGNS    ***************************************************************  Emmanuelle Hernandes, PGY5  Fellow Hematology/Oncology  pager: [phone]   Available on [employer] Teams  After 5pm or on weekends please contact  to page on-call fellow   ***************************************************************   49M with PMH of MGUS, HLD, GERD with treated H Pylori infection, polycythemia vera and no PSHx presenting with 1 day of abdominal pain associated with multiple episodes of NBNB emesis. CTAP with PO and IV contrast showing dilated loops of small bowel with more discrete transition point in the left carol-abdomen concerning for SBO. Hematology consulted for management of PV.    CBC today (3/4/25):  WBC 10.8, Hgb 14.9,   CBC (3/3/25): WBC 16.4, Hgb 15.7,     # JAK2+ PV  # IgG Kappa MGUS  - Laboratory evaluation beginning in December,2024 revealed white blood cell count 12,000, hemoglobin 16.7, hematocrit 15.9, platelets 403,000, normal differential, normal CMP, . Serum protein electrophoresis revealed an M-spike 0.3 with a gamma-migrating paraprotein shown to be IgG kappa. Quantitative IgG 1,298, IgA 262, IgM 142 and serum free light chain ratio 1.71. Urine protein electrophoresis and immunoelectrophoresis were normal. Erythropoietin level was 1.4.  - No bone lesions on MRI lumbar spine. There is a small risk ,1% annually, of transformation to non-Hodgkin lymphoma or multiple myeloma. Currently on surveillance for MGUS.  - JAK2+ consistent with polycythemia vera. Currently on aspirin 81 mg daily  - Phlebotomize 450 ml to keep hgb <15    Recommendations:  - Dr. Nichole recommended to repeat CBC today. Per outpatient notes, Hgb < 15 is the goal to phlebotomize 450 cc. However, labs today have Hgb < 14.9 and yesterday Hgb 15.7. If repeat CBC has Hgb < 15 no need to phlebotomize. However, Hgb > 15, please call blood bank to perform phlebotomy removing 450 cc.   - Can hold aspirin for a procedure (and 5 days prior) and restart after procedure and on discharge  - CBC with diff daily  - Please start hep ggt or lovenox and hold 6h prior to procedure for heparin and 12h prior to procedure for lovenox, he is high risk of clotting  - Rest of care per primary team  - Will set up with Dr. Nichole at [facility] upon discharge    **For any questions please check [facility] on call or [facility] schedule for heme fellow on call.    Please teams with any additional questions.  Hematology will continue to follow with you.    NOTE INCOMPLETE UNTIL ATTENDING SIGNS    ***************************************************************  Emmanuelle Hernandes, PGY5  Fellow Hematology/Oncology  pager: [phone]   Available on [employer] Teams  After 5pm or on weekends please contact  to page on-call fellow   ***************************************************************   49M with PMH of MGUS, HLD, GERD with treated H Pylori infection, polycythemia vera and no PSHx presenting with 1 day of abdominal pain associated with multiple episodes of NBNB emesis. CTAP with PO and IV contrast showing dilated loops of small bowel with more discrete transition point in the left carol-abdomen concerning for SBO. Hematology consulted for management of PV.    CBC today (3/4/25):  WBC 10.8, Hgb 14.9,   CBC (3/3/25): WBC 16.4, Hgb 15.7,     # JAK2+ PV  # IgG Kappa MGUS  - Laboratory evaluation beginning in December,2024 revealed white blood cell count 12,000, hemoglobin 16.7, hematocrit 15.9, platelets 403,000, normal differential, normal CMP, . Serum protein electrophoresis revealed an M-spike 0.3 with a gamma-migrating paraprotein shown to be IgG kappa. Quantitative IgG 1,298, IgA 262, IgM 142 and serum free light chain ratio 1.71. Urine protein electrophoresis and immunoelectrophoresis were normal. Erythropoietin level was 1.4.  - No bone lesions on MRI lumbar spine. There is a small risk ,1% annually, of transformation to non-Hodgkin lymphoma or multiple myeloma. Currently on surveillance for MGUS.  - JAK2+ consistent with polycythemia vera. Currently on aspirin 81 mg daily  - Phlebotomize 450 ml to keep hgb <15    Recommendations:  - Dr. Nichole recommended to repeat CBC today. Per outpatient notes, Hgb < 15 is the goal to phlebotomize 450 cc. However, labs today have Hgb < 14.9 and yesterday Hgb 15.7. If repeat CBC has Hgb < 15 no need to phlebotomize. However, Hgb > 15, please call blood bank to perform phlebotomy removing 450 cc.   - Can hold aspirin for a procedure (and 5 days prior) and restart after procedure and on discharge  - CBC with diff daily  - Please start hep ggt or lovenox and hold 6h prior to procedure for heparin and 12h prior to procedure for lovenox, he is high risk of clotting  - Rest of care per primary team  - Will set up with Dr. Nichole at [facility] upon discharge. Dr. Nichole notified 3/4/25.    **For any questions please check [facility] on call or [facility] schedule for heme fellow on call.    Please teams with any additional questions.  Hematology will continue to follow with you.    NOTE INCOMPLETE UNTIL ATTENDING SIGNS    ***************************************************************  Emmanuelle Hernandes, PGY5  Fellow Hematology/Oncology  pager: [phone]   Available on [employer] Teams  After 5pm or on weekends please contact  to page on-call fellow   ***************************************************************

## 2025-03-04 NOTE — PRE PROCEDURE NOTE - PRE PROCEDURE EVALUATION
14.9   10.79 )-----------( 355      ( 04 Mar 2025 07:24 )             45.2       03-04    142  |  103  |  10  ----------------------------<  56[L]  3.9   |  25  |  0.97    Ca    9.0      04 Mar 2025 07:24  Phos  3.4     03-04  Mg     2.1     03-04    TPro  7.9  /  Alb  4.6  /  TBili  0.7  /  DBili  x   /  AST  29  /  ALT  30  /  AlkPhos  102  03-03      type and screen/ coags ordered     NPO past midnight   consent on chart     [phone]

## 2025-03-04 NOTE — CONSULT NOTE ADULT - SUBJECTIVE AND OBJECTIVE BOX
HPI:  49M with PMH of MGUS, HLD, GERD with treated H Pylori infection, polycythemia vera and no PSHx presenting with 1 day of abdominal pain associated with multiple episodes of NBNB emesis. Patient was recently admitted for the same symptoms and discharged 3/2 with toleration of PO and having bowel function. CTAP at that admission was showing dilated loops of bowel possible partial SBO or ileus with no discrete transition point. Patient reports he went home feeling well but last night had dinner and started feeling abdominal pain which worsened overnight with several episodes of NBNB emesis. Reports last BM was yesterday before pain began and he thinks he is passing gas but can't say for sure. Denies fever, chills.    In ED, patient is afebrile, hemodynamically wnl.  Leukocytosis to 14  Lactate normal at 1.5 (03 Mar 2025 08:57)      14 point ROS otherwise negative    PAST MEDICAL & SURGICAL HISTORY:  HLD (hyperlipidemia)      Gastritis      Peptic ulcer      History of polycythemia      SBO (small bowel obstruction)      No significant past surgical history          Allergies    No Known Allergies    Intolerances        MEDICATIONS  (STANDING):  enoxaparin Injectable 40 milliGRAM(s) SubCutaneous every 24 hours  lactated ringers. 1000 milliLiter(s) (100 mL/Hr) IV Continuous <Continuous>  pantoprazole  Injectable 40 milliGRAM(s) IV Push daily    MEDICATIONS  (PRN):      FAMILY HISTORY:      SOCIAL HISTORY: No EtOH, no tobacco        VITALS:   T(F): 98.6 (03-04-25 @ 09:17), Max: 98.6 (03-04-25 @ 09:17)  HR: 63 (03-04-25 @ 09:17)  BP: 115/64 (03-04-25 @ 09:17)  RR: 18 (03-04-25 @ 09:17)  SpO2: 98% (03-04-25 @ 09:17)  Wt(kg): --    PHYSICAL EXAM    GENERAL: NAD, well-developed  HEAD:  Atraumatic, Normocephalic  EYES: EOMI, PERRLA, conjunctiva and sclera clear  NECK: Supple, No JVD  CHEST/LUNG: Clear to auscultation bilaterally; No wheeze  HEART: Regular rate and rhythm; No murmurs, rubs, or gallops  ABDOMEN: Soft, Nontender, Nondistended; Bowel sounds present  EXTREMITIES:  2+ Peripheral Pulses, No clubbing, cyanosis, or edema  NEUROLOGY: non-focal  SKIN: No rashes or lesions    LABS:                         14.9   10.79 )-----------( 355      ( 04 Mar 2025 07:24 )             45.2     03-04    142  |  103  |  10  ----------------------------<  56[L]  3.9   |  25  |  0.97    Ca    9.0      04 Mar 2025 07:24  Phos  3.4     03-04  Mg     2.1     03-04    TPro  7.9  /  Alb  4.6  /  TBili  0.7  /  DBili  x   /  AST  29  /  ALT  30  /  AlkPhos  102  03-03    Magnesium: 2.1 mg/dL (03-04 @ 07:24)  Phosphorus: 3.4 mg/dL (03-04 @ 07:24)          IMAGING: HPI:  49M with PMH of MGUS, HLD, GERD with treated H Pylori infection, polycythemia vera and no PSHx presenting with 1 day of abdominal pain associated with multiple episodes of NBNB emesis. Patient was recently admitted for the same symptoms and discharged 3/2 with toleration of PO and having bowel function. CTAP at that admission was showing dilated loops of bowel possible partial SBO or ileus with no discrete transition point. Patient reports he went home feeling well but last night had dinner and started feeling abdominal pain which worsened overnight with several episodes of NBNB emesis. Reports last BM was yesterday before pain began and he thinks he is passing gas but can't say for sure. Denies fever, chills.    In ED, patient is afebrile, hemodynamically wnl.  Leukocytosis to 14  Lactate normal at 1.5 (03 Mar 2025 08:57)      14 point ROS otherwise negative    PAST MEDICAL & SURGICAL HISTORY:  HLD (hyperlipidemia)      Gastritis      Peptic ulcer      History of polycythemia      SBO (small bowel obstruction)      No significant past surgical history          Allergies    No Known Allergies    Intolerances        MEDICATIONS  (STANDING):  enoxaparin Injectable 40 milliGRAM(s) SubCutaneous every 24 hours  lactated ringers. 1000 milliLiter(s) (100 mL/Hr) IV Continuous <Continuous>  pantoprazole  Injectable 40 milliGRAM(s) IV Push daily    MEDICATIONS  (PRN):      FAMILY HISTORY:      SOCIAL HISTORY: No EtOH, no tobacco        VITALS:   T(F): 98.6 (03-04-25 @ 09:17), Max: 98.6 (03-04-25 @ 09:17)  HR: 63 (03-04-25 @ 09:17)  BP: 115/64 (03-04-25 @ 09:17)  RR: 18 (03-04-25 @ 09:17)  SpO2: 98% (03-04-25 @ 09:17)  Wt(kg): --    PHYSICAL EXAM    GENERAL: NAD, well-developed  HEAD:  Atraumatic, Normocephalic  EYES: EOMI, PERRLA, conjunctiva and sclera clear  NECK: Supple, No JVD  CHEST/LUNG: Clear to auscultation bilaterally; No wheeze  HEART: Regular rate and rhythm; No murmurs, rubs, or gallops  ABDOMEN: Soft, (+) tender to palpation, Nondistended  EXTREMITIES:  2+ Peripheral Pulses, No clubbing, cyanosis, or edema  NEUROLOGY: non-focal  SKIN: No rashes or lesions    LABS:                         14.9   10.79 )-----------( 355      ( 04 Mar 2025 07:24 )             45.2     03-04    142  |  103  |  10  ----------------------------<  56[L]  3.9   |  25  |  0.97    Ca    9.0      04 Mar 2025 07:24  Phos  3.4     03-04  Mg     2.1     03-04    TPro  7.9  /  Alb  4.6  /  TBili  0.7  /  DBili  x   /  AST  29  /  ALT  30  /  AlkPhos  102  03-03    Magnesium: 2.1 mg/dL (03-04 @ 07:24)  Phosphorus: 3.4 mg/dL (03-04 @ 07:24)          IMAGING:

## 2025-03-05 ENCOUNTER — TRANSCRIPTION ENCOUNTER (OUTPATIENT)
Age: 50
End: 2025-03-05

## 2025-03-05 LAB
ANION GAP SERPL CALC-SCNC: 11 MMOL/L — SIGNIFICANT CHANGE UP (ref 5–17)
APTT BLD: 35 SEC — SIGNIFICANT CHANGE UP (ref 24.5–35.6)
BASOPHILS # BLD AUTO: 0.05 K/UL — SIGNIFICANT CHANGE UP (ref 0–0.2)
BASOPHILS NFR BLD AUTO: 0.4 % — SIGNIFICANT CHANGE UP (ref 0–2)
BLD GP AB SCN SERPL QL: NEGATIVE — SIGNIFICANT CHANGE UP
BUN SERPL-MCNC: 8 MG/DL — SIGNIFICANT CHANGE UP (ref 7–23)
CALCIUM SERPL-MCNC: 8.9 MG/DL — SIGNIFICANT CHANGE UP (ref 8.4–10.5)
CHLORIDE SERPL-SCNC: 103 MMOL/L — SIGNIFICANT CHANGE UP (ref 96–108)
CO2 SERPL-SCNC: 25 MMOL/L — SIGNIFICANT CHANGE UP (ref 22–31)
CREAT SERPL-MCNC: 0.92 MG/DL — SIGNIFICANT CHANGE UP (ref 0.5–1.3)
EGFR: 101 ML/MIN/1.73M2 — SIGNIFICANT CHANGE UP
EGFR: 101 ML/MIN/1.73M2 — SIGNIFICANT CHANGE UP
EOSINOPHIL # BLD AUTO: 0.2 K/UL — SIGNIFICANT CHANGE UP (ref 0–0.5)
EOSINOPHIL NFR BLD AUTO: 1.7 % — SIGNIFICANT CHANGE UP (ref 0–6)
GLUCOSE SERPL-MCNC: 108 MG/DL — HIGH (ref 70–99)
HCT VFR BLD CALC: 41.1 % — SIGNIFICANT CHANGE UP (ref 39–50)
HGB BLD-MCNC: 13.4 G/DL — SIGNIFICANT CHANGE UP (ref 13–17)
IMM GRANULOCYTES NFR BLD AUTO: 0.5 % — SIGNIFICANT CHANGE UP (ref 0–0.9)
INR BLD: 1.16 RATIO — SIGNIFICANT CHANGE UP (ref 0.85–1.16)
LYMPHOCYTES # BLD AUTO: 1.69 K/UL — SIGNIFICANT CHANGE UP (ref 1–3.3)
LYMPHOCYTES # BLD AUTO: 14.1 % — SIGNIFICANT CHANGE UP (ref 13–44)
MAGNESIUM SERPL-MCNC: 2.2 MG/DL — SIGNIFICANT CHANGE UP (ref 1.6–2.6)
MCHC RBC-ENTMCNC: 30.5 PG — SIGNIFICANT CHANGE UP (ref 27–34)
MCHC RBC-ENTMCNC: 32.6 G/DL — SIGNIFICANT CHANGE UP (ref 32–36)
MCV RBC AUTO: 93.6 FL — SIGNIFICANT CHANGE UP (ref 80–100)
MONOCYTES # BLD AUTO: 0.59 K/UL — SIGNIFICANT CHANGE UP (ref 0–0.9)
MONOCYTES NFR BLD AUTO: 4.9 % — SIGNIFICANT CHANGE UP (ref 2–14)
NEUTROPHILS # BLD AUTO: 9.4 K/UL — HIGH (ref 1.8–7.4)
NEUTROPHILS NFR BLD AUTO: 78.4 % — HIGH (ref 43–77)
NRBC BLD AUTO-RTO: 0 /100 WBCS — SIGNIFICANT CHANGE UP (ref 0–0)
PHOSPHATE SERPL-MCNC: 3.7 MG/DL — SIGNIFICANT CHANGE UP (ref 2.5–4.5)
PLATELET # BLD AUTO: 359 K/UL — SIGNIFICANT CHANGE UP (ref 150–400)
POTASSIUM SERPL-MCNC: 3.9 MMOL/L — SIGNIFICANT CHANGE UP (ref 3.5–5.3)
POTASSIUM SERPL-SCNC: 3.9 MMOL/L — SIGNIFICANT CHANGE UP (ref 3.5–5.3)
PROTHROM AB SERPL-ACNC: 13.2 SEC — SIGNIFICANT CHANGE UP (ref 9.9–13.4)
RBC # BLD: 4.39 M/UL — SIGNIFICANT CHANGE UP (ref 4.2–5.8)
RBC # FLD: 13.9 % — SIGNIFICANT CHANGE UP (ref 10.3–14.5)
RH IG SCN BLD-IMP: POSITIVE — SIGNIFICANT CHANGE UP
SODIUM SERPL-SCNC: 139 MMOL/L — SIGNIFICANT CHANGE UP (ref 135–145)
WBC # BLD: 11.99 K/UL — HIGH (ref 3.8–10.5)
WBC # FLD AUTO: 11.99 K/UL — HIGH (ref 3.8–10.5)

## 2025-03-05 PROCEDURE — 49320 DIAG LAPARO SEPARATE PROC: CPT

## 2025-03-05 PROCEDURE — 99223 1ST HOSP IP/OBS HIGH 75: CPT | Mod: GC

## 2025-03-05 DEVICE — LIGATING CLIPS WECK HEMOLOK POLYMER MEDIUM-LARGE (GREEN) 6: Type: IMPLANTABLE DEVICE | Status: FUNCTIONAL

## 2025-03-05 DEVICE — CLIP APPLIER COVIDIEN ENDOCLIP II 10MM MED/LG: Type: IMPLANTABLE DEVICE | Status: FUNCTIONAL

## 2025-03-05 RX ORDER — ONDANSETRON HCL/PF 4 MG/2 ML
4 VIAL (ML) INJECTION ONCE
Refills: 0 | Status: COMPLETED | OUTPATIENT
Start: 2025-03-05 | End: 2025-03-05

## 2025-03-05 RX ORDER — SODIUM CHLORIDE 9 G/1000ML
1000 INJECTION, SOLUTION INTRAVENOUS
Refills: 0 | Status: DISCONTINUED | OUTPATIENT
Start: 2025-03-05 | End: 2025-03-06

## 2025-03-05 RX ORDER — ACETAMINOPHEN 500 MG/5ML
1000 LIQUID (ML) ORAL EVERY 6 HOURS
Refills: 0 | Status: DISCONTINUED | OUTPATIENT
Start: 2025-03-05 | End: 2025-03-05

## 2025-03-05 RX ORDER — OXYCODONE HYDROCHLORIDE 30 MG/1
5 TABLET ORAL EVERY 6 HOURS
Refills: 0 | Status: DISCONTINUED | OUTPATIENT
Start: 2025-03-05 | End: 2025-03-05

## 2025-03-05 RX ORDER — ONDANSETRON HCL/PF 4 MG/2 ML
4 VIAL (ML) INJECTION EVERY 6 HOURS
Refills: 0 | Status: DISCONTINUED | OUTPATIENT
Start: 2025-03-05 | End: 2025-03-07

## 2025-03-05 RX ORDER — SODIUM CHLORIDE 9 G/1000ML
1000 INJECTION, SOLUTION INTRAVENOUS
Refills: 0 | Status: DISCONTINUED | OUTPATIENT
Start: 2025-03-05 | End: 2025-03-05

## 2025-03-05 RX ORDER — PHENAZOPYRIDINE HCL 100 MG
100 TABLET ORAL EVERY 8 HOURS
Refills: 0 | Status: COMPLETED | OUTPATIENT
Start: 2025-03-05 | End: 2025-03-07

## 2025-03-05 RX ORDER — ACETAMINOPHEN 500 MG/5ML
650 LIQUID (ML) ORAL EVERY 6 HOURS
Refills: 0 | Status: DISCONTINUED | OUTPATIENT
Start: 2025-03-05 | End: 2025-03-06

## 2025-03-05 RX ORDER — HYDROMORPHONE/SOD CHLOR,ISO/PF 2 MG/10 ML
0.5 SYRINGE (ML) INJECTION
Refills: 0 | Status: DISCONTINUED | OUTPATIENT
Start: 2025-03-05 | End: 2025-03-05

## 2025-03-05 RX ORDER — ASPIRIN 325 MG
81 TABLET ORAL DAILY
Refills: 0 | Status: DISCONTINUED | OUTPATIENT
Start: 2025-03-05 | End: 2025-03-07

## 2025-03-05 RX ORDER — HEPARIN SODIUM 1000 [USP'U]/ML
5000 INJECTION INTRAVENOUS; SUBCUTANEOUS EVERY 8 HOURS
Refills: 0 | Status: DISCONTINUED | OUTPATIENT
Start: 2025-03-05 | End: 2025-03-06

## 2025-03-05 RX ADMIN — Medication 40 MILLIGRAM(S): at 14:48

## 2025-03-05 RX ADMIN — Medication 400 MILLIGRAM(S): at 14:48

## 2025-03-05 RX ADMIN — Medication 1000 MILLIGRAM(S): at 15:18

## 2025-03-05 RX ADMIN — Medication 4 MILLIGRAM(S): at 13:23

## 2025-03-05 RX ADMIN — HEPARIN SODIUM 5000 UNIT(S): 1000 INJECTION INTRAVENOUS; SUBCUTANEOUS at 13:23

## 2025-03-05 RX ADMIN — Medication 1 LOZENGE: at 21:09

## 2025-03-05 RX ADMIN — Medication 650 MILLIGRAM(S): at 23:23

## 2025-03-05 RX ADMIN — HEPARIN SODIUM 5000 UNIT(S): 1000 INJECTION INTRAVENOUS; SUBCUTANEOUS at 21:08

## 2025-03-05 RX ADMIN — Medication 100 MILLIGRAM(S): at 21:56

## 2025-03-05 RX ADMIN — Medication 81 MILLIGRAM(S): at 17:34

## 2025-03-05 NOTE — DISCHARGE NOTE PROVIDER - PROVIDER TOKENS
PROVIDER:[TOKEN:[[id number]],FOLLOWUP:[2 weeks]],PROVIDER:[TOKEN:[[id number]],FOLLOWUP:[1 week]] PROVIDER:[TOKEN:[[id number]],FOLLOWUP:[2 weeks]],PROVIDER:[TOKEN:[[id number]],FOLLOWUP:[1 week]],PROVIDER:[TOKEN:[[id number]],FOLLOWUP:[2 weeks]],PROVIDER:[TOKEN:[[id number]],FOLLOWUP:[2 weeks]]

## 2025-03-05 NOTE — DISCHARGE NOTE PROVIDER - CARE PROVIDER_API CALL
Katrin Benitez  Surgical Critical Care  [address] [place] [address]  Phone: [phone]  Fax: [phone]  Follow Up Time: 2 weeks    Morteza Navarro  Internal Medicine  [address] [facility], [address]  [place] [address]  Phone: [phone]  Fax: [phone]  Follow Up Time: 1 week   Katrin Benitez  Surgical Critical Care  [address] [place] [address]  Phone: [phone]  Fax: [phone]  Follow Up Time: 2 weeks    Morteza Navarro  Internal Medicine  [address] [facility], [address]  [place] [address]  Phone: [phone]  Fax: [phone]  Follow Up Time: 1 week    Greg Ivy)  Gastroenterology  [address] [facility], [address]  [place] [address]  Phone: [phone]  Fax: [phone]  Follow Up Time: 2 weeks    Juan Nichole  Hematology  [address] [place] [address]  Phone: [phone]  Fax: [phone]  Follow Up Time: 2 weeks

## 2025-03-05 NOTE — DISCHARGE NOTE PROVIDER - HOSPITAL COURSE
HPI:  49M with PMH of MGUS, HLD, GERD with treated H Pylori infection, polycythemia vera and no PSHx presenting with 1 day of abdominal pain associated with multiple episodes of NBNB emesis. Patient was recently admitted for the same symptoms and discharged 3/2 with toleration of PO and having bowel function. CTAP at that admission was showing dilated loops of bowel possible partial SBO or ileus with no discrete transition point. Patient reports he went home feeling well but last night had dinner and started feeling abdominal pain which worsened overnight with several episodes of NBNB emesis. Reports last BM was yesterday before pain began and he thinks he is passing gas but can't say for sure. Denies fever, chills.  In ED, patient is afebrile, hemodynamically wnl.  Leukocytosis to 14  Lactate normal at 1.5 (03 Mar 2025 08:57)    Patient admitted to surgery. CT scan showed dilated loops of SB with single transition point in the L hemiabd compatible with small bowel obstruction. Patient refused NGT. Kept NPO. Follow up interval AXR showed progression of contrast to colon. Patient with return of some GI function. Booked for diagnostic laparoscopy. Heme/onc consulted for patient hx with polycythemia vera. Underwent diag lap without complication and was transferred to PACU then floor. Postop, pain well controlled. Diet advanced as tolerated. Ambulation and IS encouraged. Labs trended. DVT ppx with SQH. Cleared for discharge.                49M with PMH of MGUS, HLD, GERD with treated H Pylori infection, polycythemia vera and no PSHx presenting with 1 day of abdominal pain associated with multiple episodes of NBNB emesis. Patient was recently admitted for the same symptoms and discharged 3/2 with toleration of PO and having bowel function. CTAP at that admission was showing dilated loops of bowel possible partial SBO or ileus with no discrete transition point. Patient reports he went home feeling well but last night had dinner and started feeling abdominal pain which worsened overnight with several episodes of NBNB emesis. Reports last BM was yesterday before pain began and he thinks he is passing gas but can't say for sure. Denies fever, chills.  Per outpatient notes, Hgb < 15 is the goal to phlebotomize 450 cc.    In ED, patient is afebrile, hemodynamically wnl. Leukocytosis to 14. Lactate was normal at 1.5 (03 Mar 2025 08:57)    Patient admitted to surgery. CT scan showed dilated loops of SB with single transition point in the L carol-abdomen compatible with small bowel obstruction. Patient refused NGT. Kept NPO. Follow up interval AXR showed progression of contrast to colon. Patient with return of GI function. Planned for diagnostic laparoscopy. Heme/onc consulted for patient hx with polycythemia vera and MGUS and recommended perioperative prophylaxis with LMWH and ASA81. On 5/5 patient underwent Diagnostic laparoscopy without complication. Findings were: No abnormalities encountered. Bowel wad decompressed and not inflamed. No internal hernias or adhesive bands found and was transferred to PACU postperatively, then floor. Postop, pain was well controlled with multimodal analgesia. Diet was slowly advanced and tolerated. Ambulation and IS encouraged. Labs trended. DVT ppx with Lovenox, ASA81 maintained, and SCDs. Day of discharge labs revealed H/H 14/42.5 which was within the threshold. Pt will follow up as outpatient with Hematology Dr. Nichole. Overall stay uneventful. Pt stable for discharge home.                49M with PMH of MGUS, HLD, GERD with treated H Pylori infection, polycythemia vera and no PSHx presenting with 1 day of abdominal pain associated with multiple episodes of NBNB emesis. Patient was recently admitted for the same symptoms and discharged 3/2 with toleration of PO and having bowel function. CTAP at that admission was showing dilated loops of bowel possible partial SBO or ileus with no discrete transition point. Patient reports he went home feeling well but last night had dinner and started feeling abdominal pain which worsened overnight with several episodes of NBNB emesis. Reports last BM was yesterday before pain began and he thinks he is passing gas but can't say for sure. Denies fever, chills.  Per outpatient notes, Hgb < 15 is the goal to phlebotomize 450 cc.    In ED, patient is afebrile, hemodynamically wnl. Leukocytosis to 14. Lactate was normal at 1.5 (03 Mar 2025 08:57)    Patient admitted to surgery. CT scan showed dilated loops of SB with single transition point in the L carol-abdomen compatible with small bowel obstruction. Patient refused NGT. Kept NPO. Follow up interval AXR showed progression of contrast to colon. Patient with return of GI function. Planned for diagnostic laparoscopy. Heme/onc consulted for patient hx with polycythemia vera and MGUS and recommended perioperative prophylaxis with LMWH and ASA81. On 5/5 patient underwent Diagnostic laparoscopy without complication. Findings were: No abnormalities encountered. Bowel wad decompressed and not inflamed. No internal hernias or adhesive bands found and was transferred to PACU postperatively, then floor. Postop, pain was well controlled with multimodal analgesia. Diet was slowly advanced and tolerated. GI consulted -outpatient followup with Dr. Ivy for video capsule endoscopy Ambulation and IS encouraged. Labs trended. DVT ppx with Lovenox, ASA81 maintained, and SCDs. Day of discharge labs revealed H/H 14/42.5 which was within the threshold. Pt will follow up as outpatient with Hematology Dr. Nichole. Overall stay uneventful. Pt stable for discharge home.

## 2025-03-05 NOTE — CONSULT NOTE ADULT - ATTENDING COMMENTS
Agree with above. Patient with SBO x 2, first in late 2024 and this admission. Ex-lap shows no lesions on running of the bowel. He has sacroileitis, but already had negative MRE done after the first episode of SBO. While IBD is still in differential, he just had ex-lap and has not yet passed flatus. Would advance diet as tolerated, if obstructive symptoms all resolve, can plan for outpatient video capsule for evaluation of the entire small bowel (risks of retention/obstruction explained, he wants to pursue VCE outpatient).
49-yr-old man admitted with suspected SBO seen in consult for patient's h/o PV and MGUS. Patient gets phlebotomy with HGB target of <15 (HCT <45%). He is also on daily ASA (on hold now). Agree with LMWH. Note that patient is a high risk candidate for splanchnic thrombosis in the post operative period. Start DVT ppx postoperatively as soon as hemostasis is insured. His MGUS low risk, follow up with surveillance.

## 2025-03-05 NOTE — DISCHARGE NOTE PROVIDER - NSDCFUSCHEDAPPT_GEN_ALL_CORE_FT
Juan Nichole Physician [facility]  [facility]  Scheduled Appointment: 04/04/2025    Juan Nichole [facility]  [facility]  Scheduled Appointment: 04/04/2025     Greg Ivy  [facility] Physician [facility] [address] [facility] [address]  Scheduled Appointment: 03/24/2025    Juan Nichole  [facility]  [facility]  Scheduled Appointment: 04/04/2025    Juan Nichole  [facility]  [facility]  Scheduled Appointment: 04/04/2025

## 2025-03-05 NOTE — PROGRESS NOTE ADULT - SUBJECTIVE AND OBJECTIVE BOX
TEAM [ACS/Trauma] Surgery Daily Progress Note  =====================================================    Overnight: NAEO    SUBJECTIVE: Patient seen and examined at bedside on AM rounds. The patient is resting comfortably in bed, no complaints of pain. NPO at midnight. Voiding. Passing flatus, no BMs. Ambulating as tolerating. Denies fever, chills, nausea, vomiting, diarrhea, chest pain, and SOB.     PMH:   PAST MEDICAL & SURGICAL HISTORY:  HLD (hyperlipidemia)      Gastritis      Peptic ulcer      History of polycythemia      SBO (small bowel obstruction)      No significant past surgical history          ALLERGIES:  No Known Allergies      --------------------------------------------------------------------------------------    MEDICATIONS:    Neurologic Medications    Respiratory Medications    Cardiovascular Medications    Gastrointestinal Medications  dextrose 5% + sodium chloride 0.45% with potassium chloride 20 mEq/L 1000 milliLiter(s) IV Continuous <Continuous>  pantoprazole  Injectable 40 milliGRAM(s) IV Push daily    Genitourinary Medications    Hematologic/Oncologic Medications  aspirin enteric coated 81 milliGRAM(s) Oral daily  enoxaparin Injectable 40 milliGRAM(s) SubCutaneous every 24 hours    Antimicrobial/Immunologic Medications    Endocrine/Metabolic Medications    Topical/Other Medications    --------------------------------------------------------------------------------------    VITAL SIGNS:    T(C): 36.9 (03-05-25 @ 09:03), Max: 37.2 (03-04-25 @ 21:02)  HR: 60 (03-05-25 @ 09:03) (60 - 89)  BP: 118/75 (03-05-25 @ 09:03) (110/64 - 129/75)  RR: 15 (03-05-25 @ 09:03) (15 - 18)  SpO2: 98% (03-05-25 @ 09:03) (96% - 99%)    --------------------------------------------------------------------------------------    EXAM    General: NAD, resting in bed comfortably.  Cardiac: regular rate, warm and well perfused  Respiratory: Nonlabored respirations, normal cw expansion.  Abdomen: soft, nontender, mildly distended  Extremities: normal strength, FROM, no deformities    --------------------------------------------------------------------------------------    LABS        --------------------------------------      --->>> <<<---------------------------------------------    INS AND OUTS:    03-04-25 @ 07:01  -  03-05-25 @ 07:00  --------------------------------------------------------  IN: 2320 mL / OUT: 1400 mL / NET: 920 mL    03-05-25 @ 07:01  -  03-05-25 @ 09:33  --------------------------------------------------------  IN: 0 mL / OUT: 0 mL / NET: 0 mL        --------------------------------------------------------------------------------------

## 2025-03-05 NOTE — BRIEF OPERATIVE NOTE - OPERATION/FINDINGS
Veress needle insufflation at diaz's point. 3 5mm ports placed left lateral abdomen. Small bowel ran from ida of treves to ligament of treitz. No abnomalities encountered. Bowel wad decompressed and not inflamed. No internal hernias or adhesive bands found.

## 2025-03-05 NOTE — CONSULT NOTE ADULT - SUBJECTIVE AND OBJECTIVE BOX
Chief Complaint:  Patient is a 49y old  Male who presents with a chief complaint of SBO (05 Mar 2025 13:05)      HPI:  49M with PMH of MGUS, HLD, GERD with treated H Pylori infection, polycythemia vera and no PSHx presenting with 1 day of abdominal pain associated with multiple episodes of NBNB emesis. Patient was recently admitted for the same symptoms and discharged 3/2 with toleration of PO and having bowel function. CTAP at that admission was showing dilated loops of bowel possible partial SBO or ileus with no discrete transition point. Patient reports he went home feeling well but last night had dinner and started feeling abdominal pain which worsened overnight with several episodes of NBNB emesis. Reports last BM was yesterday before pain began and he thinks he is passing gas but can't say for sure. Denies fever, chills.  He is now s/p exploratory laparoscopy 3/5 with no notable findings.       Allergies:  No Known Allergies      Home Medications:    Hospital Medications:  acetaminophen   IVPB .. 1000 milliGRAM(s) IV Intermittent every 6 hours PRN  aspirin enteric coated 81 milliGRAM(s) Oral daily  heparin   Injectable 5000 Unit(s) SubCutaneous every 8 hours  lactated ringers. 1000 milliLiter(s) IV Continuous <Continuous>  ondansetron Injectable 4 milliGRAM(s) IV Push every 6 hours PRN      PMHX/PSHX:  HLD (hyperlipidemia)    Gastritis    Peptic ulcer    History of polycythemia    SBO (small bowel obstruction)    No significant past surgical history        Family history:  No pertinent family history in first degree relatives        Denies family history of colon cancer/polyps, stomach cancer/polyps, pancreatic cancer/masses, liver cancer/disease, ovarian cancer and endometrial cancer.    Social History:   Tob: Denies  EtOH: Denies  Illicit Drugs: Denies    ROS:     General:  No wt loss, fevers, chills, night sweats, fatigue  Eyes:  Good vision, no reported pain  ENT:  No sore throat, pain, runny nose, dysphagia  CV:  No pain, palpitations, hypo/hypertension  Pulm:  No dyspnea, cough, tachypnea, wheezing  GI:  see HPI  :  No pain, bleeding, incontinence, nocturia  Muscle:  No pain, weakness  Neuro:  No weakness, tingling, memory problems  Psych:  No fatigue, insomnia, mood problems, depression  Endocrine:  No polyuria, polydipsia, cold/heat intolerance  Heme:  No petechiae, ecchymosis, easy bruisability  Skin:  No rash, tattoos, scars, edema    PHYSICAL EXAM:     GENERAL:  No acute distress  HEENT:  NCAT, no scleral icterus   CHEST:  no respiratory distress  HEART:  Regular rate and rhythm  ABDOMEN:  Soft, non-tender, non-distended, normoactive bowel sounds,  no masses, no hepato-splenomegaly, no signs of chronic liver disease  EXTREMITIES: No edema  SKIN:  No rash/erythema/ecchymoses/petechiae/wounds/abscess/warm/dry  NEURO:  Alert and oriented x 3, no asterixis    Vital Signs:  Vital Signs Last 24 Hrs  T(C): 36.4 (05 Mar 2025 16:30), Max: 37.2 (04 Mar 2025 21:02)  T(F): 97.5 (05 Mar 2025 16:30), Max: 98.9 (04 Mar 2025 21:02)  HR: 81 (05 Mar 2025 16:30) (53 - 89)  BP: 125/74 (05 Mar 2025 16:30) (110/64 - 127/76)  BP(mean): 94 (05 Mar 2025 13:00) (82 - 96)  RR: 17 (05 Mar 2025 16:30) (15 - 18)  SpO2: 98% (05 Mar 2025 16:30) (96% - 100%)    Parameters below as of 05 Mar 2025 16:30  Patient On (Oxygen Delivery Method): room air      Daily Height in cm: 165.1 (05 Mar 2025 10:26)    Daily     LABS:                        13.4   11.99 )-----------( 359      ( 05 Mar 2025 00:51 )             41.1     Mean Cell Volume: 93.6 fl (03-05-25 @ 00:51)    03-05    139  |  103  |  8   ----------------------------<  108[H]  3.9   |  25  |  0.92    Ca    8.9      05 Mar 2025 00:51  Phos  3.7     03-05  Mg     2.2     03-05        PT/INR - ( 05 Mar 2025 00:51 )   PT: 13.2 sec;   INR: 1.16 ratio         PTT - ( 05 Mar 2025 00:51 )  PTT:35.0 sec  Urinalysis Basic - ( 05 Mar 2025 00:51 )    Color: x / Appearance: x / SG: x / pH: x  Gluc: 108 mg/dL / Ketone: x  / Bili: x / Urobili: x   Blood: x / Protein: x / Nitrite: x   Leuk Esterase: x / RBC: x / WBC x   Sq Epi: x / Non Sq Epi: x / Bacteria: x                              13.4   11.99 )-----------( 359      ( 05 Mar 2025 00:51 )             41.1                         14.8   13.59 )-----------( 392      ( 04 Mar 2025 15:55 )             45.4                         14.9   10.79 )-----------( 355      ( 04 Mar 2025 07:24 )             45.2                         15.7   16.39 )-----------( 440      ( 03 Mar 2025 03:46 )             46.9       Imaging:           Chief Complaint:  Patient is a 49y old  Male who presents with a chief complaint of SBO (05 Mar 2025 13:05)      HPI:  49M with PMH of MGUS, HLD, GERD with treated H Pylori infection, polycythemia vera and no PSHx presenting with 1 day of abdominal pain associated with multiple episodes of NBNB emesis. Patient was recently admitted for the same symptoms and discharged 3/2 with toleration of PO and having bowel function. CTAP at that admission was showing dilated loops of bowel possible partial SBO or ileus with no discrete transition point. Patient reports he went home feeling well but last night had dinner and started feeling abdominal pain which worsened overnight with several episodes of NBNB emesis. Reports last BM was yesterday before pain began and he thinks he is passing gas but can't say for sure. Denies fever, chills.He is now s/p exploratory laparoscopy 3/5 with no notable findings.   GI team consulted for recurrent SBOs of unknown etiology. Patient follows with Dr. Ivy. He recently (Nov 2024) underwent an MR enterography which was normal. He was also tested for ASCA antibodies, which were negative. He last had a colonoscopy in 2019 which was also unremarkable. He denies any history of diarrhea, joint pains, vision changes, rashes. He does have chronic constipation with anal fissures, and back pain.     Allergies:  No Known Allergies      Home Medications:    Hospital Medications:  acetaminophen   IVPB .. 1000 milliGRAM(s) IV Intermittent every 6 hours PRN  aspirin enteric coated 81 milliGRAM(s) Oral daily  heparin   Injectable 5000 Unit(s) SubCutaneous every 8 hours  lactated ringers. 1000 milliLiter(s) IV Continuous <Continuous>  ondansetron Injectable 4 milliGRAM(s) IV Push every 6 hours PRN      PMHX/PSHX:  HLD (hyperlipidemia)    Gastritis    Peptic ulcer    History of polycythemia    SBO (small bowel obstruction)    No significant past surgical history        Family history:  No pertinent family history in first degree relatives        Denies family history of colon cancer/polyps, stomach cancer/polyps, pancreatic cancer/masses, liver cancer/disease, ovarian cancer and endometrial cancer.    Social History:   Tob: Denies  EtOH: Denies  Illicit Drugs: Denies    ROS:     General:  No wt loss, fevers, chills, night sweats, fatigue  Eyes:  Good vision, no reported pain  ENT:  No sore throat, pain, runny nose, dysphagia  CV:  No pain, palpitations, hypo/hypertension  Pulm:  No dyspnea, cough, tachypnea, wheezing  GI:  see HPI  :  No pain, bleeding, incontinence, nocturia  Muscle:  No pain, weakness  Neuro:  No weakness, tingling, memory problems  Psych:  No fatigue, insomnia, mood problems, depression  Endocrine:  No polyuria, polydipsia, cold/heat intolerance  Heme:  No petechiae, ecchymosis, easy bruisability  Skin:  No rash, tattoos, scars, edema    PHYSICAL EXAM:     GENERAL:  No acute distress  HEENT:  NCAT, no scleral icterus   CHEST:  no respiratory distress  HEART:  Regular rate and rhythm  ABDOMEN:  Soft, non-tender, non-distended, normoactive bowel sounds  EXTREMITIES: No edema  SKIN:  No rash/erythema/ecchymoses/petechiae/wounds/abscess/warm/dry  NEURO:  Alert and oriented x 3, no asterixis    Vital Signs:  Vital Signs Last 24 Hrs  T(C): 36.4 (05 Mar 2025 16:30), Max: 37.2 (04 Mar 2025 21:02)  T(F): 97.5 (05 Mar 2025 16:30), Max: 98.9 (04 Mar 2025 21:02)  HR: 81 (05 Mar 2025 16:30) (53 - 89)  BP: 125/74 (05 Mar 2025 16:30) (110/64 - 127/76)  BP(mean): 94 (05 Mar 2025 13:00) (82 - 96)  RR: 17 (05 Mar 2025 16:30) (15 - 18)  SpO2: 98% (05 Mar 2025 16:30) (96% - 100%)    Parameters below as of 05 Mar 2025 16:30  Patient On (Oxygen Delivery Method): room air      Daily Height in cm: 165.1 (05 Mar 2025 10:26)    Daily     LABS:                        13.4   11.99 )-----------( 359      ( 05 Mar 2025 00:51 )             41.1     Mean Cell Volume: 93.6 fl (03-05-25 @ 00:51)    03-05    139  |  103  |  8   ----------------------------<  108[H]  3.9   |  25  |  0.92    Ca    8.9      05 Mar 2025 00:51  Phos  3.7     03-05  Mg     2.2     03-05        PT/INR - ( 05 Mar 2025 00:51 )   PT: 13.2 sec;   INR: 1.16 ratio         PTT - ( 05 Mar 2025 00:51 )  PTT:35.0 sec  Urinalysis Basic - ( 05 Mar 2025 00:51 )    Color: x / Appearance: x / SG: x / pH: x  Gluc: 108 mg/dL / Ketone: x  / Bili: x / Urobili: x   Blood: x / Protein: x / Nitrite: x   Leuk Esterase: x / RBC: x / WBC x   Sq Epi: x / Non Sq Epi: x / Bacteria: x                              13.4   11.99 )-----------( 359      ( 05 Mar 2025 00:51 )             41.1                         14.8   13.59 )-----------( 392      ( 04 Mar 2025 15:55 )             45.4                         14.9   10.79 )-----------( 355      ( 04 Mar 2025 07:24 )             45.2                         15.7   16.39 )-----------( 440      ( 03 Mar 2025 03:46 )             46.9       Imaging:    < from: CT Abdomen and Pelvis w/ Oral Cont and w/ IV Cont (03.03.25 @ 06:39) >    IMPRESSION:  Dilated loops of small bowel with single transition point in the left   hemiabdomen compatible withsmall bowel obstruction.        --- End of Report ---    < end of copied text >         Chief Complaint:  Patient is a 49y old  Male who presents with a chief complaint of SBO (05 Mar 2025 13:05)      HPI:  49M with PMH of MGUS, HLD, GERD with treated H Pylori infection, polycythemia vera and no PSHx presenting with 1 day of abdominal pain associated with multiple episodes of NBNB emesis. Patient was recently admitted for the same symptoms and discharged 3/2 with toleration of PO and having bowel function. CTAP at that admission was showing dilated loops of bowel possible partial SBO or ileus with no discrete transition point. Patient reports he went home feeling well but last night had dinner and started feeling abdominal pain which worsened overnight with several episodes of NBNB emesis. Reports last BM was yesterday before pain began and he thinks he is passing gas but can't say for sure. Denies fever, chills.He is now s/p exploratory laparoscopy 3/5 with no notable findings.   GI team consulted for recurrent SBOs of unknown etiology. Patient follows with Dr. Teran. He recently (Nov 2024) underwent an MR enterography which was normal. He was also tested for ASCA antibodies, which were negative. He last had a colonoscopy in 2019 which was also unremarkable. He denies any history of diarrhea, joint pains, vision changes, rashes. He does have chronic constipation with anal fissures, and back pain. Had one other episode of SBO in late 2024, which was what prompted Dr. Teran's evaluation and MRE. 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 No family history of colon cancer, IBD or autoimmune disease.    Allergies:  No Known Allergies      Home Medications:    Hospital Medications:  acetaminophen   IVPB .. 1000 milliGRAM(s) IV Intermittent every 6 hours PRN  aspirin enteric coated 81 milliGRAM(s) Oral daily  heparin   Injectable 5000 Unit(s) SubCutaneous every 8 hours  lactated ringers. 1000 milliLiter(s) IV Continuous <Continuous>  ondansetron Injectable 4 milliGRAM(s) IV Push every 6 hours PRN      PMHX/PSHX:  HLD (hyperlipidemia)    Gastritis    Peptic ulcer    History of polycythemia    SBO (small bowel obstruction)    No significant past surgical history        Family history:  No pertinent family history in first degree relatives        Denies family history of colon cancer/polyps, stomach cancer/polyps, pancreatic cancer/masses, liver cancer/disease, ovarian cancer and endometrial cancer.    Social History:   Tob: Denies  EtOH: Denies  Illicit Drugs: Denies    ROS:     General:  No wt loss, fevers, chills, night sweats, fatigue  Eyes:  Good vision, no reported pain  ENT:  No sore throat, pain, runny nose, dysphagia  CV:  No pain, palpitations, hypo/hypertension  Pulm:  No dyspnea, cough, tachypnea, wheezing  GI:  see HPI  :  No pain, bleeding, incontinence, nocturia  Muscle:  No pain, weakness  Neuro:  No weakness, tingling, memory problems  Psych:  No fatigue, insomnia, mood problems, depression  Endocrine:  No polyuria, polydipsia, cold/heat intolerance  Heme:  No petechiae, ecchymosis, easy bruisability  Skin:  No rash, tattoos, scars, edema    PHYSICAL EXAM:     GENERAL:  No acute distress  HEENT:  NCAT, no scleral icterus   CHEST:  no respiratory distress  HEART:  Regular rate and rhythm  ABDOMEN:  Soft, non-tender, non-distended, normoactive bowel sounds  EXTREMITIES: No edema  SKIN:  No rash/erythema/ecchymoses/petechiae/wounds/abscess/warm/dry  NEURO:  Alert and oriented x 3, no asterixis    Vital Signs:  Vital Signs Last 24 Hrs  T(C): 36.4 (05 Mar 2025 16:30), Max: 37.2 (04 Mar 2025 21:02)  T(F): 97.5 (05 Mar 2025 16:30), Max: 98.9 (04 Mar 2025 21:02)  HR: 81 (05 Mar 2025 16:30) (53 - 89)  BP: 125/74 (05 Mar 2025 16:30) (110/64 - 127/76)  BP(mean): 94 (05 Mar 2025 13:00) (82 - 96)  RR: 17 (05 Mar 2025 16:30) (15 - 18)  SpO2: 98% (05 Mar 2025 16:30) (96% - 100%)    Parameters below as of 05 Mar 2025 16:30  Patient On (Oxygen Delivery Method): room air      Daily Height in cm: 165.1 (05 Mar 2025 10:26)    Daily     LABS:                        13.4   11.99 )-----------( 359      ( 05 Mar 2025 00:51 )             41.1     Mean Cell Volume: 93.6 fl (03-05-25 @ 00:51)    03-05    139  |  103  |  8   ----------------------------<  108[H]  3.9   |  25  |  0.92    Ca    8.9      05 Mar 2025 00:51  Phos  3.7     03-05  Mg     2.2     03-05        PT/INR - ( 05 Mar 2025 00:51 )   PT: 13.2 sec;   INR: 1.16 ratio         PTT - ( 05 Mar 2025 00:51 )  PTT:35.0 sec  Urinalysis Basic - ( 05 Mar 2025 00:51 )    Color: x / Appearance: x / SG: x / pH: x  Gluc: 108 mg/dL / Ketone: x  / Bili: x / Urobili: x   Blood: x / Protein: x / Nitrite: x   Leuk Esterase: x / RBC: x / WBC x   Sq Epi: x / Non Sq Epi: x / Bacteria: x                              13.4   11.99 )-----------( 359      ( 05 Mar 2025 00:51 )             41.1                         14.8   13.59 )-----------( 392      ( 04 Mar 2025 15:55 )             45.4                         14.9   10.79 )-----------( 355      ( 04 Mar 2025 07:24 )             45.2                         15.7   16.39 )-----------( 440      ( 03 Mar 2025 03:46 )             46.9       Imaging:    < from: CT Abdomen and Pelvis w/ Oral Cont and w/ IV Cont (03.03.25 @ 06:39) >    IMPRESSION:  Dilated loops of small bowel with single transition point in the left   hemiabdomen compatible withsmall bowel obstruction.        --- End of Report ---    < end of copied text >    MRE 11/29/24  EXAM: [id number] - MR ABDOMEN OC WAW IC - ORDERED BY: RAFA TERAN    EXAM: [id number] - MR PELVIS WAW IC - ORDERED BY: RAFA TERAN      PROCEDURE DATE: 11/29/2024        INTERPRETATION: CLINICAL INFORMATION: Enteritis. Small bowel obstruction    COMPARISON: CT abdomen and pelvis 9/11/2024    CONTRAST/COMPLICATIONS:  IV Contrast: Gadavist 7.5 cc administered 0 cc discarded  Oral Contrast: Breeza Lemon Lime  .    PROCEDURE:  MRI of the abdomen and pelvis was performed as per Enterography protocol.      FINDINGS:  LOWER CHEST: Within normal limits.    LIVER: Within normal limits.  BILE DUCTS: Normal caliber.  GALLBLADDER: Nondistended. Contains a small volume of sludge  SPLEEN: Within normal limits.  PANCREAS: Within normal limits.  ADRENALS: Within normal limits.  KIDNEYS/URETERS: Within normal limits.    BLADDER: Distended. No wall thickening  REPRODUCTIVE ORGANS: Prostate within normal size limits. Central prostate BPH changes. Please note this exam was not tailored to assess the prostate gland    BOWEL: No bowel obstruction.  PERITONEUM: No ascites.  VESSELS: Within normal limits.  RETROPERITONEUM/LYMPH NODES: No lymphadenopathy.  ABDOMINAL WALL: No significant acute abnormality.  BONES: No significant acute bony abnormality.    IMPRESSION:  Unobstructed bowel. No findings to indicate active bowel inflammation        --- End of Report ---

## 2025-03-05 NOTE — DISCHARGE NOTE PROVIDER - NSDCCPCAREPLAN_GEN_ALL_CORE_FT
PRINCIPAL DISCHARGE DIAGNOSIS  Diagnosis: SBO (small bowel obstruction)  Assessment and Plan of Treatment: WOUND CARE:  Please remove any outer dressings in 24 hours. DO NOT remove steri-strips - they will fall off on their own or be removed at your follow up visit.  BATHING: Please do not submerge wound underwater. No swimming pools, no hot tubs, no tub baths. You may shower/sponge bathe in 24 hours. Let soapy water run over incisions. DO NOT scrub or soak incision sites. Pat dry.   ACTIVITY: No heavy lifting more than 10-15lbs or straining. Otherwise, you may return to your usual level of physical activity and complete your daily activities. Take frequent walks. If you are taking narcotic pain medication (such as Oxycodone), do NOT drive a car, operate machinery or make important decisions.  DIET: Regular diet as tolerated   NOTIFY YOUR SURGEON IF: You have any bleeding that does not stop, any pus draining from your wound, any fever (over 100.4 F) or chills, persistent nausea/vomiting with inability to tolerate food or liquids, persistent diarrhea, or if your pain is not controlled on your discharge pain medications.  FOLLOW-UP:  1. You may [facility] up at [facility] in 2-4 weeks. You may follow up with Dr. Benitez or any of their partners (Dr. Benitez, Tiago Lucas, Ender, Supriya, Nissa, Varun, Shane Torre, Todd, Abhijeet). Please call [phone] if you have any questions or concerns regarding your surgery and treatment   2. Please follow up with your primary care physician in one week regarding your hospitalization.     PRINCIPAL DISCHARGE DIAGNOSIS  Diagnosis: SBO (small bowel obstruction)  Assessment and Plan of Treatment: WOUND CARE:   DO NOT remove steri-strips - they will fall off on their own or be removed at your follow up visit.  BATHING: Please do not submerge wound underwater. No swimming pools, no hot tubs, no tub baths. You may shower/sponge bathe in 24 hours. Let soapy water run over incisions. DO NOT scrub or soak incision sites. Pat dry.   ACTIVITY: No heavy lifting more than 10-15lbs or straining. Otherwise, you may return to your usual level of physical activity and complete your daily activities. Take frequent walks. If you are taking narcotic pain medication (such as Oxycodone), do NOT drive a car, operate machinery or make important decisions.  DIET: Regular diet as tolerated   NOTIFY YOUR SURGEON IF: You have any bleeding that does not stop, any pus draining from your wound, any fever (over 100.4 F) or chills, persistent nausea/vomiting with inability to tolerate food or liquids, persistent diarrhea, or if your pain is not controlled on your discharge pain medications.  FOLLOW-UP:  1. You may follow up at the [facility] in 2-4 weeks. You may follow up with Dr. Benitez or any of her partners (Dr. Benitez, Shayy, Tiago, Ender, Supriya, Nissa, Varun, Nicho, Shane, Todd, Abhijeet). Please call [phone] if you have any questions or concerns regarding your surgery and treatment   2. Please follow up with your primary care physician in one week regarding your hospitalization.  3. Please  outpatient followup withGI  Dr. Ivy for video capsule endoscopy

## 2025-03-05 NOTE — DISCHARGE NOTE PROVIDER - NSDCMRMEDTOKEN_GEN_ALL_CORE_FT
aspirin 81 mg oral tablet: orally once a day  Protonix 40 mg oral delayed release tablet: 1 tab(s) orally  rosuvastatin 10 mg oral tablet: 1 tab(s) orally once a day (at bedtime)   aspirin 81 mg oral tablet: orally once a day  pantoprazole 40 mg oral delayed release tablet: 1 tab(s) orally once a day (before a meal)  rosuvastatin 10 mg oral tablet: 1 tab(s) orally once a day (at bedtime)  Tylenol 325 mg oral capsule: 2 cap(s) orally every 6 hours as needed for  mild pain Max daily dose of Tylenol should not exceed 4000mg/24 hrs

## 2025-03-05 NOTE — CONSULT NOTE ADULT - ASSESSMENT
49M with PMH of MGUS, HLD, GERD with treated H Pylori infection, polycythemia vera, presenting with SBO.  2nd episode, unclear etiology. Negative exploratory laparoscopic exam. Outpatient MRE in November also negative for signs of small bowel inflammation. Lower concern for Crohn's given no other associated symptoms, and normal inflammatory makers (checked outpatient).   Next step in small bowel evaluation would be a video capsule endoscopy, however, would hold off at this time given recent surgery.     Recommendations:   -outpatient followup with Dr. Ivy for video capsule endoscopy  -advance diet as tolerated  -remainder of care per primary team     Note incomplete until finalized by attending signature/attestation.    Maritza Gutierrez  GI/Hepatology Fellow    MONDAY-FRIDAY 8AM-5PM:  Pager# [phone] ([facility]) or [phone] ([facility])    NON-URGENT CONSULTS:  Please email [email or web address]  AT NIGHT AND ON WEEKENDS:  Contact on-call GI fellow from 5pm-8am and on weekends/holidays       49M with PMH of MGUS, HLD, GERD with treated H Pylori infection, polycythemia vera, presenting with SBO.  2nd episode, unclear etiology. Negative exploratory laparoscopic exam. Outpatient MRE in November also negative for signs of small bowel inflammation. Lower concern for Crohn's given no other associated symptoms, and normal inflammatory makers (checked outpatient).   Next step in small bowel evaluation would be a video capsule endoscopy, however, would hold off at this time given recent surgery and patient has not yet pass flatus.    Recommendations:   -outpatient followup with Dr. Ivy for video capsule endoscopy  -advance diet as tolerated  -remainder of care per primary team     Note incomplete until finalized by attending signature/attestation.    Maritza Gutierrez  GI/Hepatology Fellow    MONDAY-FRIDAY 8AM-5PM:  Pager# [phone] ([facility]) or [phone] ([facility])    NON-URGENT CONSULTS:  Please email [email or web address]  AT NIGHT AND ON WEEKENDS:  Contact on-call GI fellow from 5pm-8am and on weekends/holidays

## 2025-03-05 NOTE — DISCHARGE NOTE PROVIDER - CARE PROVIDERS DIRECT ADDRESSES
,DirectAddress_Unknown,[email or web address] ,DirectAddress_Unknown,[email or web address]

## 2025-03-05 NOTE — CHART NOTE - NSCHARTNOTEFT_GEN_A_CORE
Post Operative Note  Patient: MERCY WELLINGTON 49y (1975) Male   MRN: [id number]  Location: [facility] 2MON 223 D1  Visit: 03-03-25 Inpatient  Date: 03-05-25 @ 15:22    Procedure: S/P diagnostic laparoscopy     Subjective:   Patient seen and examined at bedside. Reports feeling some pain and nausea postop relieved by medications. Tolerated few sips of clears.     Objective:  Vitals: T(F): 97.6 (03-05-25 @ 14:30), Max: 98.9 (03-04-25 @ 21:02)  HR: 76 (03-05-25 @ 14:30)  BP: 120/76 (03-05-25 @ 14:30) (110/64 - 127/76)  RR: 17 (03-05-25 @ 14:30)  SpO2: 99% (03-05-25 @ 14:30)  Vent Settings:     In:   03-04-25 @ 07:01  -  03-05-25 @ 07:00  --------------------------------------------------------  IN: 2320 mL    03-05-25 @ 07:01  -  03-05-25 @ 15:22  --------------------------------------------------------  IN: 0 mL      IV Fluids: lactated ringers. 1000 milliLiter(s) (75 mL/Hr) IV Continuous <Continuous>      Out:   03-04-25 @ 07:01  -  03-05-25 @ 07:00  --------------------------------------------------------  OUT: 1400 mL    03-05-25 @ 07:01  -  03-05-25 @ 15:22  --------------------------------------------------------  OUT: 0 mL      EBL:     Voided Urine:   03-04-25 @ 07:01  -  03-05-25 @ 07:00  --------------------------------------------------------  OUT: 1400 mL    03-05-25 @ 07:01  -  03-05-25 @ 15:22  --------------------------------------------------------  OUT: 0 mL    PHYSICAL EXAM:  GENERAL: NAD, resting comfortably   HEAD: NC, AT  RESPIRATORY: Nonlabored breathing.   ABDOMEN: Incisions c/d/i with opsite dressings. Soft, nondistended, appropriately tender to palpation surrounding incision sites.  EXTREMITIES: Warm, well perfused   NEURO: Responds appropriately     Medications: [Standing]  acetaminophen   IVPB .. 1000 milliGRAM(s) IV Intermittent every 6 hours PRN  aspirin enteric coated 81 milliGRAM(s) Oral daily  heparin   Injectable 5000 Unit(s) SubCutaneous every 8 hours  lactated ringers. 1000 milliLiter(s) IV Continuous <Continuous>  ondansetron Injectable 4 milliGRAM(s) IV Push every 6 hours PRN    Medications: [PRN]  acetaminophen   IVPB .. 1000 milliGRAM(s) IV Intermittent every 6 hours PRN  aspirin enteric coated 81 milliGRAM(s) Oral daily  heparin   Injectable 5000 Unit(s) SubCutaneous every 8 hours  lactated ringers. 1000 milliLiter(s) IV Continuous <Continuous>  ondansetron Injectable 4 milliGRAM(s) IV Push every 6 hours PRN    Assessment:  50 y/o M admitted with SBO, now S/P diagnostic laparoscopy     Plan:  - Continue current care  - Continue clears. Advance to LRD as tolerated  - IVL when tolerating adequate PO intake  - Pain control - patient requesting only prn Tylenol  - Home medications  - Incentive spirometry, encourage ambulation  - SCDs, resumed ASA81, SQH  - GI consult  - St removed at end of case, f/u void  - Follow up AM labs  - Will continue to monitor    ACS/Trauma Surgery  p [phone]

## 2025-03-06 LAB
ANION GAP SERPL CALC-SCNC: 18 MMOL/L — HIGH (ref 5–17)
APPEARANCE UR: CLEAR — SIGNIFICANT CHANGE UP
BACTERIA # UR AUTO: NEGATIVE /HPF — SIGNIFICANT CHANGE UP
BASOPHILS # BLD AUTO: 0.09 K/UL — SIGNIFICANT CHANGE UP (ref 0–0.2)
BASOPHILS NFR BLD AUTO: 0.5 % — SIGNIFICANT CHANGE UP (ref 0–2)
BILIRUB UR-MCNC: NEGATIVE — SIGNIFICANT CHANGE UP
BUN SERPL-MCNC: 8 MG/DL — SIGNIFICANT CHANGE UP (ref 7–23)
CALCIUM SERPL-MCNC: 9.6 MG/DL — SIGNIFICANT CHANGE UP (ref 8.4–10.5)
CAST: 0 /LPF — SIGNIFICANT CHANGE UP (ref 0–4)
CHLORIDE SERPL-SCNC: 100 MMOL/L — SIGNIFICANT CHANGE UP (ref 96–108)
CO2 SERPL-SCNC: 21 MMOL/L — LOW (ref 22–31)
COLOR SPEC: SIGNIFICANT CHANGE UP
CREAT SERPL-MCNC: 0.98 MG/DL — SIGNIFICANT CHANGE UP (ref 0.5–1.3)
DIFF PNL FLD: NEGATIVE — SIGNIFICANT CHANGE UP
EGFR: 94 ML/MIN/1.73M2 — SIGNIFICANT CHANGE UP
EGFR: 94 ML/MIN/1.73M2 — SIGNIFICANT CHANGE UP
EOSINOPHIL # BLD AUTO: 0.06 K/UL — SIGNIFICANT CHANGE UP (ref 0–0.5)
EOSINOPHIL NFR BLD AUTO: 0.3 % — SIGNIFICANT CHANGE UP (ref 0–6)
GLUCOSE SERPL-MCNC: 99 MG/DL — SIGNIFICANT CHANGE UP (ref 70–99)
GLUCOSE UR QL: NEGATIVE MG/DL — SIGNIFICANT CHANGE UP
HCT VFR BLD CALC: 44.6 % — SIGNIFICANT CHANGE UP (ref 39–50)
HGB BLD-MCNC: 14.7 G/DL — SIGNIFICANT CHANGE UP (ref 13–17)
IMM GRANULOCYTES NFR BLD AUTO: 1 % — HIGH (ref 0–0.9)
KETONES UR-MCNC: 40 MG/DL
LEUKOCYTE ESTERASE UR-ACNC: ABNORMAL
LYMPHOCYTES # BLD AUTO: 1.49 K/UL — SIGNIFICANT CHANGE UP (ref 1–3.3)
LYMPHOCYTES # BLD AUTO: 8 % — LOW (ref 13–44)
MAGNESIUM SERPL-MCNC: 2.2 MG/DL — SIGNIFICANT CHANGE UP (ref 1.6–2.6)
MCHC RBC-ENTMCNC: 31.1 PG — SIGNIFICANT CHANGE UP (ref 27–34)
MCHC RBC-ENTMCNC: 33 G/DL — SIGNIFICANT CHANGE UP (ref 32–36)
MCV RBC AUTO: 94.5 FL — SIGNIFICANT CHANGE UP (ref 80–100)
MONOCYTES # BLD AUTO: 0.58 K/UL — SIGNIFICANT CHANGE UP (ref 0–0.9)
MONOCYTES NFR BLD AUTO: 3.1 % — SIGNIFICANT CHANGE UP (ref 2–14)
NEUTROPHILS # BLD AUTO: 16.26 K/UL — HIGH (ref 1.8–7.4)
NEUTROPHILS NFR BLD AUTO: 87.1 % — HIGH (ref 43–77)
NITRITE UR-MCNC: POSITIVE
NRBC BLD AUTO-RTO: 0 /100 WBCS — SIGNIFICANT CHANGE UP (ref 0–0)
PH UR: 7 — SIGNIFICANT CHANGE UP (ref 5–8)
PHOSPHATE SERPL-MCNC: 3.6 MG/DL — SIGNIFICANT CHANGE UP (ref 2.5–4.5)
PLATELET # BLD AUTO: 440 K/UL — HIGH (ref 150–400)
POTASSIUM SERPL-MCNC: 3.9 MMOL/L — SIGNIFICANT CHANGE UP (ref 3.5–5.3)
POTASSIUM SERPL-SCNC: 3.9 MMOL/L — SIGNIFICANT CHANGE UP (ref 3.5–5.3)
PROT UR-MCNC: NEGATIVE MG/DL — SIGNIFICANT CHANGE UP
RBC # BLD: 4.72 M/UL — SIGNIFICANT CHANGE UP (ref 4.2–5.8)
RBC # FLD: 14 % — SIGNIFICANT CHANGE UP (ref 10.3–14.5)
RBC CASTS # UR COMP ASSIST: 0 /HPF — SIGNIFICANT CHANGE UP (ref 0–4)
REVIEW: SIGNIFICANT CHANGE UP
SODIUM SERPL-SCNC: 139 MMOL/L — SIGNIFICANT CHANGE UP (ref 135–145)
SP GR SPEC: 1.01 — SIGNIFICANT CHANGE UP (ref 1–1.03)
SQUAMOUS # UR AUTO: 0 /HPF — SIGNIFICANT CHANGE UP (ref 0–5)
UROBILINOGEN FLD QL: 1 MG/DL — SIGNIFICANT CHANGE UP (ref 0.2–1)
WBC # BLD: 18.66 K/UL — HIGH (ref 3.8–10.5)
WBC # FLD AUTO: 18.66 K/UL — HIGH (ref 3.8–10.5)
WBC UR QL: 6 /HPF — HIGH (ref 0–5)

## 2025-03-06 PROCEDURE — 99024 POSTOP FOLLOW-UP VISIT: CPT

## 2025-03-06 RX ORDER — ENOXAPARIN SODIUM 100 MG/ML
40 INJECTION SUBCUTANEOUS EVERY 24 HOURS
Refills: 0 | Status: DISCONTINUED | OUTPATIENT
Start: 2025-03-06 | End: 2025-03-07

## 2025-03-06 RX ORDER — ACETAMINOPHEN 500 MG/5ML
1000 LIQUID (ML) ORAL EVERY 6 HOURS
Refills: 0 | Status: DISCONTINUED | OUTPATIENT
Start: 2025-03-06 | End: 2025-03-07

## 2025-03-06 RX ORDER — ACETAMINOPHEN 500 MG/5ML
975 LIQUID (ML) ORAL EVERY 6 HOURS
Refills: 0 | Status: DISCONTINUED | OUTPATIENT
Start: 2025-03-06 | End: 2025-03-06

## 2025-03-06 RX ADMIN — Medication 400 MILLIGRAM(S): at 19:56

## 2025-03-06 RX ADMIN — Medication 100 MILLIGRAM(S): at 05:22

## 2025-03-06 RX ADMIN — Medication 650 MILLIGRAM(S): at 05:39

## 2025-03-06 RX ADMIN — HEPARIN SODIUM 5000 UNIT(S): 1000 INJECTION INTRAVENOUS; SUBCUTANEOUS at 05:21

## 2025-03-06 RX ADMIN — Medication 100 MILLIGRAM(S): at 13:11

## 2025-03-06 RX ADMIN — Medication 100 MILLIGRAM(S): at 21:04

## 2025-03-06 RX ADMIN — Medication 40 MILLIGRAM(S): at 05:22

## 2025-03-06 RX ADMIN — Medication 81 MILLIGRAM(S): at 18:06

## 2025-03-06 NOTE — PROGRESS NOTE ADULT - SUBJECTIVE AND OBJECTIVE BOX
SURGERY DAILY PROGRESS NOTE:       Subjective / Overnight events:  Tolerating clears, denies N/V.  +flatus, no BM.  Pain controlled with medications.      Objective:      MEDICATIONS  (STANDING):  aspirin enteric coated 81 milliGRAM(s) Oral daily  enoxaparin Injectable 40 milliGRAM(s) SubCutaneous every 24 hours  pantoprazole    Tablet 40 milliGRAM(s) Oral before breakfast  phenazopyridine 100 milliGRAM(s) Oral every 8 hours    MEDICATIONS  (PRN):  acetaminophen     Tablet .. 650 milliGRAM(s) Oral every 6 hours PRN Mild Pain (1 - 3)  benzocaine/menthol Lozenge 1 Lozenge Oral three times a day PRN Sore Throat  ondansetron Injectable 4 milliGRAM(s) IV Push every 6 hours PRN Nausea      Vital Signs Last 24 Hrs  T(C): 36.9 (06 Mar 2025 08:34), Max: 36.9 (05 Mar 2025 10:26)  T(F): 98.5 (06 Mar 2025 08:34), Max: 98.5 (06 Mar 2025 08:34)  HR: 88 (06 Mar 2025 08:34) (53 - 88)  BP: 113/71 (06 Mar 2025 08:34) (110/65 - 127/76)  BP(mean): 94 (05 Mar 2025 13:00) (82 - 96)  RR: 18 (06 Mar 2025 08:34) (15 - 18)  SpO2: 98% (06 Mar 2025 08:34) (96% - 100%)    Parameters below as of 06 Mar 2025 08:34  Patient On (Oxygen Delivery Method): room air        I&O's Detail    05 Mar 2025 07:01  -  06 Mar 2025 07:00  --------------------------------------------------------  IN:    Lactated Ringers: 1275 mL    Oral Fluid: 720 mL  Total IN: 1995 mL    OUT:    Blood Loss (mL): 0 mL    Voided (mL): 2300 mL  Total OUT: 2300 mL    Total NET: -305 mL          Daily Height in cm: 165.1 (05 Mar 2025 10:26)    Daily     LABS:                        14.7   18.66 )-----------( 440      ( 06 Mar 2025 07:11 )             44.6     03-06    139  |  100  |  8   ----------------------------<  99  3.9   |  21[L]  |  0.98    Ca    9.6      06 Mar 2025 07:08  Phos  3.6     03-06  Mg     2.2     03-06      PT/INR - ( 05 Mar 2025 00:51 )   PT: 13.2 sec;   INR: 1.16 ratio         PTT - ( 05 Mar 2025 00:51 )  PTT:35.0 sec  Urinalysis Basic - ( 06 Mar 2025 07:08 )    Color: x / Appearance: x / SG: x / pH: x  Gluc: 99 mg/dL / Ketone: x  / Bili: x / Urobili: x   Blood: x / Protein: x / Nitrite: x   Leuk Esterase: x / RBC: x / WBC x   Sq Epi: x / Non Sq Epi: x / Bacteria: x            Physical Exam:    PHYSICAL EXAM:  GENERAL: NAD, resting comfortably   HEAD: NC, AT  RESPIRATORY: Nonlabored breathing.   ABDOMEN: Incisions c/d/i with opsite dressings. Soft, nondistended, appropriately tender to palpation surrounding incision sites.  EXTREMITIES: Warm, well perfused   NEURO: Responds appropriately  SURGERY DAILY PROGRESS NOTE:     Overnight events: Complained of burning with urination after burnham was removed. Given pyridium.     Subjective. The patient was seen and examined at bedside. Tolerating clears, denies N/V.  +flatus, no BM.  Pain controlled with medications.      Objective:      MEDICATIONS  (STANDING):  aspirin enteric coated 81 milliGRAM(s) Oral daily  enoxaparin Injectable 40 milliGRAM(s) SubCutaneous every 24 hours  pantoprazole    Tablet 40 milliGRAM(s) Oral before breakfast  phenazopyridine 100 milliGRAM(s) Oral every 8 hours    MEDICATIONS  (PRN):  acetaminophen     Tablet .. 650 milliGRAM(s) Oral every 6 hours PRN Mild Pain (1 - 3)  benzocaine/menthol Lozenge 1 Lozenge Oral three times a day PRN Sore Throat  ondansetron Injectable 4 milliGRAM(s) IV Push every 6 hours PRN Nausea      Vital Signs Last 24 Hrs  T(C): 36.9 (06 Mar 2025 08:34), Max: 36.9 (05 Mar 2025 10:26)  T(F): 98.5 (06 Mar 2025 08:34), Max: 98.5 (06 Mar 2025 08:34)  HR: 88 (06 Mar 2025 08:34) (53 - 88)  BP: 113/71 (06 Mar 2025 08:34) (110/65 - 127/76)  BP(mean): 94 (05 Mar 2025 13:00) (82 - 96)  RR: 18 (06 Mar 2025 08:34) (15 - 18)  SpO2: 98% (06 Mar 2025 08:34) (96% - 100%)    Parameters below as of 06 Mar 2025 08:34  Patient On (Oxygen Delivery Method): room air        I&O's Detail    05 Mar 2025 07:01  -  06 Mar 2025 07:00  --------------------------------------------------------  IN:    Lactated Ringers: 1275 mL    Oral Fluid: 720 mL  Total IN: 1995 mL    OUT:    Blood Loss (mL): 0 mL    Voided (mL): 2300 mL  Total OUT: 2300 mL    Total NET: -305 mL          Daily Height in cm: 165.1 (05 Mar 2025 10:26)    Daily     LABS:                        14.7   18.66 )-----------( 440      ( 06 Mar 2025 07:11 )             44.6     03-06    139  |  100  |  8   ----------------------------<  99  3.9   |  21[L]  |  0.98    Ca    9.6      06 Mar 2025 07:08  Phos  3.6     03-06  Mg     2.2     03-06      PT/INR - ( 05 Mar 2025 00:51 )   PT: 13.2 sec;   INR: 1.16 ratio         PTT - ( 05 Mar 2025 00:51 )  PTT:35.0 sec  Urinalysis Basic - ( 06 Mar 2025 07:08 )    Color: x / Appearance: x / SG: x / pH: x  Gluc: 99 mg/dL / Ketone: x  / Bili: x / Urobili: x   Blood: x / Protein: x / Nitrite: x   Leuk Esterase: x / RBC: x / WBC x   Sq Epi: x / Non Sq Epi: x / Bacteria: x            Physical Exam:    PHYSICAL EXAM:  GENERAL: NAD, resting comfortably   HEAD: NC, AT  RESPIRATORY: Nonlabored breathing.   ABDOMEN: Incisions c/d/i with opsite dressings. Soft, nondistended, appropriately tender to palpation surrounding incision sites.  EXTREMITIES: Warm, well perfused   NEURO: Responds appropriately

## 2025-03-07 VITALS
OXYGEN SATURATION: 95 % | DIASTOLIC BLOOD PRESSURE: 73 MMHG | TEMPERATURE: 98 F | RESPIRATION RATE: 18 BRPM | SYSTOLIC BLOOD PRESSURE: 115 MMHG | HEART RATE: 70 BPM

## 2025-03-07 LAB
ANION GAP SERPL CALC-SCNC: 13 MMOL/L — SIGNIFICANT CHANGE UP (ref 5–17)
BUN SERPL-MCNC: 13 MG/DL — SIGNIFICANT CHANGE UP (ref 7–23)
CALCIUM SERPL-MCNC: 8.7 MG/DL — SIGNIFICANT CHANGE UP (ref 8.4–10.5)
CHLORIDE SERPL-SCNC: 105 MMOL/L — SIGNIFICANT CHANGE UP (ref 96–108)
CO2 SERPL-SCNC: 22 MMOL/L — SIGNIFICANT CHANGE UP (ref 22–31)
CREAT SERPL-MCNC: 0.97 MG/DL — SIGNIFICANT CHANGE UP (ref 0.5–1.3)
EGFR: 95 ML/MIN/1.73M2 — SIGNIFICANT CHANGE UP
EGFR: 95 ML/MIN/1.73M2 — SIGNIFICANT CHANGE UP
GLUCOSE SERPL-MCNC: 95 MG/DL — SIGNIFICANT CHANGE UP (ref 70–99)
HCT VFR BLD CALC: 42.5 % — SIGNIFICANT CHANGE UP (ref 39–50)
HGB BLD-MCNC: 14 G/DL — SIGNIFICANT CHANGE UP (ref 13–17)
MAGNESIUM SERPL-MCNC: 2.1 MG/DL — SIGNIFICANT CHANGE UP (ref 1.6–2.6)
MCHC RBC-ENTMCNC: 31.1 PG — SIGNIFICANT CHANGE UP (ref 27–34)
MCHC RBC-ENTMCNC: 32.9 G/DL — SIGNIFICANT CHANGE UP (ref 32–36)
MCV RBC AUTO: 94.4 FL — SIGNIFICANT CHANGE UP (ref 80–100)
NRBC BLD AUTO-RTO: 0 /100 WBCS — SIGNIFICANT CHANGE UP (ref 0–0)
PHOSPHATE SERPL-MCNC: 3.4 MG/DL — SIGNIFICANT CHANGE UP (ref 2.5–4.5)
PLATELET # BLD AUTO: 406 K/UL — HIGH (ref 150–400)
POTASSIUM SERPL-MCNC: 3.9 MMOL/L — SIGNIFICANT CHANGE UP (ref 3.5–5.3)
POTASSIUM SERPL-SCNC: 3.9 MMOL/L — SIGNIFICANT CHANGE UP (ref 3.5–5.3)
RBC # BLD: 4.5 M/UL — SIGNIFICANT CHANGE UP (ref 4.2–5.8)
RBC # FLD: 14.2 % — SIGNIFICANT CHANGE UP (ref 10.3–14.5)
SODIUM SERPL-SCNC: 140 MMOL/L — SIGNIFICANT CHANGE UP (ref 135–145)
WBC # BLD: 13.18 K/UL — HIGH (ref 3.8–10.5)
WBC # FLD AUTO: 13.18 K/UL — HIGH (ref 3.8–10.5)

## 2025-03-07 PROCEDURE — 99285 EMERGENCY DEPT VISIT HI MDM: CPT

## 2025-03-07 PROCEDURE — 86900 BLOOD TYPING SEROLOGIC ABO: CPT

## 2025-03-07 PROCEDURE — 85014 HEMATOCRIT: CPT

## 2025-03-07 PROCEDURE — 85027 COMPLETE CBC AUTOMATED: CPT

## 2025-03-07 PROCEDURE — 83690 ASSAY OF LIPASE: CPT

## 2025-03-07 PROCEDURE — 81001 URINALYSIS AUTO W/SCOPE: CPT

## 2025-03-07 PROCEDURE — 86901 BLOOD TYPING SEROLOGIC RH(D): CPT

## 2025-03-07 PROCEDURE — 80048 BASIC METABOLIC PNL TOTAL CA: CPT

## 2025-03-07 PROCEDURE — 86850 RBC ANTIBODY SCREEN: CPT

## 2025-03-07 PROCEDURE — 36415 COLL VENOUS BLD VENIPUNCTURE: CPT

## 2025-03-07 PROCEDURE — 71045 X-RAY EXAM CHEST 1 VIEW: CPT

## 2025-03-07 PROCEDURE — 82435 ASSAY OF BLOOD CHLORIDE: CPT

## 2025-03-07 PROCEDURE — 82330 ASSAY OF CALCIUM: CPT

## 2025-03-07 PROCEDURE — 96374 THER/PROPH/DIAG INJ IV PUSH: CPT

## 2025-03-07 PROCEDURE — 83735 ASSAY OF MAGNESIUM: CPT

## 2025-03-07 PROCEDURE — 96375 TX/PRO/DX INJ NEW DRUG ADDON: CPT

## 2025-03-07 PROCEDURE — 74018 RADEX ABDOMEN 1 VIEW: CPT

## 2025-03-07 PROCEDURE — C9399: CPT

## 2025-03-07 PROCEDURE — 82947 ASSAY GLUCOSE BLOOD QUANT: CPT

## 2025-03-07 PROCEDURE — 80053 COMPREHEN METABOLIC PANEL: CPT

## 2025-03-07 PROCEDURE — 83605 ASSAY OF LACTIC ACID: CPT

## 2025-03-07 PROCEDURE — 85018 HEMOGLOBIN: CPT

## 2025-03-07 PROCEDURE — 85610 PROTHROMBIN TIME: CPT

## 2025-03-07 PROCEDURE — 84132 ASSAY OF SERUM POTASSIUM: CPT

## 2025-03-07 PROCEDURE — 85730 THROMBOPLASTIN TIME PARTIAL: CPT

## 2025-03-07 PROCEDURE — 84295 ASSAY OF SERUM SODIUM: CPT

## 2025-03-07 PROCEDURE — 82803 BLOOD GASES ANY COMBINATION: CPT

## 2025-03-07 PROCEDURE — 85025 COMPLETE CBC W/AUTO DIFF WBC: CPT

## 2025-03-07 PROCEDURE — 84100 ASSAY OF PHOSPHORUS: CPT

## 2025-03-07 PROCEDURE — 74177 CT ABD & PELVIS W/CONTRAST: CPT | Mod: MC

## 2025-03-07 RX ORDER — SIMETHICONE 80 MG
80 TABLET,CHEWABLE ORAL ONCE
Refills: 0 | Status: COMPLETED | OUTPATIENT
Start: 2025-03-07 | End: 2025-03-07

## 2025-03-07 RX ORDER — SENNA 187 MG
2 TABLET ORAL ONCE
Refills: 0 | Status: COMPLETED | OUTPATIENT
Start: 2025-03-07 | End: 2025-03-07

## 2025-03-07 RX ORDER — ACETAMINOPHEN 500 MG/5ML
2 LIQUID (ML) ORAL
Qty: 0 | Refills: 0 | DISCHARGE

## 2025-03-07 RX ADMIN — Medication 2 TABLET(S): at 13:41

## 2025-03-07 RX ADMIN — Medication 81 MILLIGRAM(S): at 13:40

## 2025-03-07 RX ADMIN — Medication 40 MILLIGRAM(S): at 05:20

## 2025-03-07 RX ADMIN — Medication 20 MILLIEQUIVALENT(S): at 13:41

## 2025-03-07 RX ADMIN — Medication 80 MILLIGRAM(S): at 13:40

## 2025-03-07 RX ADMIN — ENOXAPARIN SODIUM 40 MILLIGRAM(S): 100 INJECTION SUBCUTANEOUS at 05:20

## 2025-03-07 NOTE — DISCHARGE NOTE NURSING/CASE MANAGEMENT/SOCIAL WORK - PATIENT PORTAL LINK FT
You can access the FollowMyHealth Patient Portal offered by [facility] by registering at the following website: [email or web address]. By joining [employer]’s FollowMyHealth portal, you will also be able to view your health information using other applications (apps) compatible with our system.

## 2025-03-07 NOTE — PROGRESS NOTE ADULT - ATTENDING COMMENTS
49 M with PMH of MGUS polycythemia vera, multiple admissions for SBO of unknown etiology now s/p Diagnostic Laparoscopy on 3/5/25 demonstrating normal bowel, no adhesions.     Feels well this morning  Tolerating regular diet   Some soreness around incisions but pain controlled  Passing gas, no BM    Abdomen soft appropriately tender around incisions, non distended     WBC 13  Hb 14    DC home today  Outpatient GI follow up for capsule endoscopy     I have seen and examined this patient on rounds this morning with the surgery team. I have reviewed all new labs, imaging and reports. I have participated in formulating the plan for the day, and have read and agree with the history, ROS, exam, assessment and plan as stated above.        Katrin Benitez MD    Trauma and Acute Care Surgery
ATTENDING ATTESTATION  I have seen and examined this patient on rounds thismorning with the surgery team. I have reviewed all new labs, imaging and reports. I have participated in formulating the plan for the day, and have read and agree with the history, ROS, exam, assessment and plan as stated above.     Still not feeling great thismorning, though passing some flatus.   Would like to proceed with diagnostic laparoscopy. Booked for tomorrow.   Hx of polycethemia vera --> discussed with hematology, does not require phlebotomy at this time.   Sill have sips and chips today, NPO at [facility].     Vianey Brooks M.D., M.S.  Division of Acute Care Surgery
49 M with PMH of MGUS polycythemia vera, multiple admissions for SBO of unknown etiology now s/p Diagnostic Laparoscopy on 3/5/25 demonstrating normal bowel, no adhesions.     Feels well this morning  Some soreness around incisions but pain controlled  Passing gas, no BM  Tolerating clears     Abdomen soft appropriately tender around incisions, non distended     WBC 18.6  Hb 14.7    GI consulted, plan for outpatient capsule endoscopy   Ok to advance to regular diet  If tolerating and feels well, ok for DC home later today vs tomorrow    I have seen and examined this patient on rounds this morning with the surgery team. I have reviewed all new labs, imaging and reports. I have participated in formulating the plan for the day, and have read and agree with the history, ROS, exam, assessment and plan as stated above.        Katrin Benitez MD    Trauma and Acute Care Surgery

## 2025-03-07 NOTE — DISCHARGE NOTE NURSING/CASE MANAGEMENT/SOCIAL WORK - FINANCIAL ASSISTANCE
[facility] provides services at a reduced cost to those who are determined to be eligible through [facility]’s financial assistance program. Information regarding [facility]’s financial assistance program can be found by going to [email or web address] or by calling [phone].

## 2025-03-07 NOTE — PROGRESS NOTE ADULT - ASSESSMENT
49M with PMH of MGUS, HLD, GERD with treated H Pylori infection, polycythemia vera and no PSHx presenting with 1 day of abdominal pain associated with multiple episodes of NBNB emesis. Recent admission and discharged 3/2 for similar symptoms, imaging at that time showing dilated loops of small bowel without discrete transition point. CTAP with PO and IV contrast showing dilated loops of small bowel with more discrete transition point in the left carol-abdomen concerning for SBO    SBO resolved   -advance diet vs diagnostic lap     [id number]
49M with PMH of MGUS, HLD, GERD with treated H Pylori infection, polycythemia vera and no PSHx presenting with 1 day of abdominal pain associated with multiple episodes of NBNB emesis. Recent admission and discharged 3/2 for similar symptoms, imaging at that time showing dilated loops of small bowel without discrete transition point. CTAP with PO and IV contrast showing dilated loops of small bowel with more discrete transition point in the left carol-abdomen concerning for SBO. SBO resolved. The patient is now s/p Diagnostic Laparoscopy on 3/5/25.    Plan:  - Pain control PRN  - Diet: Regular   - OOBAT  - IVL  - Apprec Heme recs  - Apprec GI recs, outpatient w/u  - Dispo: Home today    ACS/Trauma Surgery  [id number]
49M with PMH of MGUS, HLD, GERD with treated H Pylori infection, polycythemia vera and no PSHx presenting with 1 day of abdominal pain associated with multiple episodes of NBNB emesis. Recent admission and discharged 3/2 for similar symptoms, imaging at that time showing dilated loops of small bowel without discrete transition point. CTAP with PO and IV contrast showing dilated loops of small bowel with more discrete transition point in the left carol-abdomen concerning for SBO. SBO resolved     Plan:  - OR today - Diagnostic Laparoscopy  - Pain control PRN  - OOBAT  - NPO/IVF  - Apprec Heme recs    ACS/Trauma Surgery  [id number]
49M with PMH of MGUS, HLD, GERD with treated H Pylori infection, polycythemia vera and no PSHx presenting with 1 day of abdominal pain associated with multiple episodes of NBNB emesis. Recent admission and discharged 3/2 for similar symptoms, imaging at that time showing dilated loops of small bowel without discrete transition point. CTAP with PO and IV contrast showing dilated loops of small bowel with more discrete transition point in the left carol-abdomen concerning for SBO. SBO resolved. The patient is now s/p Diagnostic Laparoscopy on 3/5/25.    Plan:  - Pain control PRN  - Diet: Regular   - OOBAT  - IVL  - Apprec Heme recs  - Apprec GI recs, outpatient w/u  - Dispo: Home    ACS/Trauma Surgery  [id number]

## 2025-03-07 NOTE — PROGRESS NOTE ADULT - SUBJECTIVE AND OBJECTIVE BOX
TEAM [ACS/Trauma] Surgery Daily Progress Note  =====================================================    Overnight: NAEO    SUBJECTIVE: Patient seen and examined at bedside on AM rounds. The patient is resting comfortably in bed, pain is well controlled. Tolerating diet. Voiding and passing flatus. Ambulating well. Denies fever, chills, nausea, vomiting, diarrhea, chest pain, and SOB.    PMH:   PAST MEDICAL & SURGICAL HISTORY:  HLD (hyperlipidemia)      Gastritis      Peptic ulcer      History of polycythemia      SBO (small bowel obstruction)      No significant past surgical history          ALLERGIES:  No Known Allergies      --------------------------------------------------------------------------------------    MEDICATIONS:    Neurologic Medications  acetaminophen   IVPB .. 1000 milliGRAM(s) IV Intermittent every 6 hours PRN Mild Pain (1 - 3)  ondansetron Injectable 4 milliGRAM(s) IV Push every 6 hours PRN Nausea    Respiratory Medications    Cardiovascular Medications    Gastrointestinal Medications  pantoprazole    Tablet 40 milliGRAM(s) Oral before breakfast  potassium chloride    Tablet ER 20 milliEquivalent(s) Oral once    Genitourinary Medications  phenazopyridine 100 milliGRAM(s) Oral every 8 hours    Hematologic/Oncologic Medications  aspirin enteric coated 81 milliGRAM(s) Oral daily  enoxaparin Injectable 40 milliGRAM(s) SubCutaneous every 24 hours    Antimicrobial/Immunologic Medications    Endocrine/Metabolic Medications    Topical/Other Medications  benzocaine/menthol Lozenge 1 Lozenge Oral three times a day PRN Sore Throat    --------------------------------------------------------------------------------------    VITAL SIGNS:    T(C): 36.9 (03-07-25 @ 09:31), Max: 37.2 (03-06-25 @ 20:16)  HR: 70 (03-07-25 @ 09:31) (64 - 84)  BP: 115/73 (03-07-25 @ 09:31) (102/64 - 121/88)  RR: 18 (03-07-25 @ 09:31) (18 - 18)  SpO2: 95% (03-07-25 @ 09:31) (94% - 98%)    --------------------------------------------------------------------------------------    EXAM    General: NAD, resting in bed comfortably.  Cardiac: regular rate, warm and well perfused  Respiratory: Nonlabored respirations, normal cw expansion.  Abdomen: Incisions c/d/i with opsite dressings. Soft, nondistended, appropriately tender to palpation surrounding incision sites, small hematoma around left lateral incision, no drainage  Extremities: normal strength, FROM, no deformities    --------------------------------------------------------------------------------------    LABS        --------------------------------------      --->>> <<<---------------------------------------------    INS AND OUTS:    03-06-25 @ 07:01  -  03-07-25 @ 07:00  --------------------------------------------------------  IN: 720 mL / OUT: 1800 mL / NET: -1080 mL        --------------------------------------------------------------------------------------

## 2025-03-07 NOTE — CHART NOTE - NSCHARTNOTEFT_GEN_A_CORE
No heme contraindication to discharge if otherwise cleared from surgical perspective. Can continue monitor CBC and if Hb>15 please alert blood bank to perform phlebotomy. Pt to follow up with Dr Nichole upon discharge.    Hematology to sign off at this time. Please call us back with questions or if patient's status changes.   Emelia Antony MD PGY-4  Hematology/Oncology Fellow  Available on [place] TEAMS  Pagers: [phone]; [facility] [phone]    **For any questions please check [facility] call or [facility].

## 2025-03-19 ENCOUNTER — APPOINTMENT (OUTPATIENT)
Dept: COLORECTAL SURGERY | Facility: CLINIC | Age: 50
End: 2025-03-19
Payer: COMMERCIAL

## 2025-03-19 PROCEDURE — 46600 DIAGNOSTIC ANOSCOPY SPX: CPT

## 2025-03-19 PROCEDURE — 99213 OFFICE O/P EST LOW 20 MIN: CPT | Mod: 25

## 2025-03-24 ENCOUNTER — APPOINTMENT (OUTPATIENT)
Dept: GASTROENTEROLOGY | Facility: CLINIC | Age: 50
End: 2025-03-24
Payer: COMMERCIAL

## 2025-03-24 VITALS
SYSTOLIC BLOOD PRESSURE: 106 MMHG | HEART RATE: 76 BPM | OXYGEN SATURATION: 99 % | TEMPERATURE: 98.1 F | DIASTOLIC BLOOD PRESSURE: 63 MMHG

## 2025-03-24 DIAGNOSIS — D12.6 BENIGN NEOPLASM OF COLON, UNSPECIFIED: ICD-10-CM

## 2025-03-24 DIAGNOSIS — K56.609 UNSPECIFIED INTESTINAL OBSTRUCTION, UNSPECIFIED AS TO PARTIAL VERSUS COMPLETE OBSTRUCTION: ICD-10-CM

## 2025-03-24 DIAGNOSIS — K52.9 NONINFECTIVE GASTROENTERITIS AND COLITIS, UNSPECIFIED: ICD-10-CM

## 2025-03-24 PROCEDURE — 99214 OFFICE O/P EST MOD 30 MIN: CPT

## 2025-03-26 NOTE — ED ADULT NURSE NOTE - CHPI ED NUR SYMPTOMS NEG
Orders:    NM myocardial perfusion spect (rx stress and/or rest); Future     no abdominal distension/no blood in stool/no burning urination/no chills/no diarrhea/no dysuria/no fever/no hematuria

## 2025-03-31 ENCOUNTER — APPOINTMENT (OUTPATIENT)
Dept: SURGERY | Facility: CLINIC | Age: 50
End: 2025-03-31

## 2025-04-04 ENCOUNTER — RESULT REVIEW (OUTPATIENT)
Age: 50
End: 2025-04-04

## 2025-04-04 ENCOUNTER — APPOINTMENT (OUTPATIENT)
Dept: HEMATOLOGY ONCOLOGY | Facility: CLINIC | Age: 50
End: 2025-04-04
Payer: COMMERCIAL

## 2025-04-04 ENCOUNTER — LABORATORY RESULT (OUTPATIENT)
Age: 50
End: 2025-04-04

## 2025-04-04 ENCOUNTER — APPOINTMENT (OUTPATIENT)
Dept: HEMATOLOGY ONCOLOGY | Facility: CLINIC | Age: 50
End: 2025-04-04

## 2025-04-04 ENCOUNTER — APPOINTMENT (OUTPATIENT)
Dept: INFUSION THERAPY | Facility: HOSPITAL | Age: 50
End: 2025-04-04

## 2025-04-04 VITALS
DIASTOLIC BLOOD PRESSURE: 78 MMHG | SYSTOLIC BLOOD PRESSURE: 120 MMHG | RESPIRATION RATE: 16 BRPM | BODY MASS INDEX: 24.9 KG/M2 | OXYGEN SATURATION: 98 % | HEART RATE: 78 BPM | WEIGHT: 145.06 LBS | TEMPERATURE: 98.1 F

## 2025-04-04 DIAGNOSIS — D47.2 MONOCLONAL GAMMOPATHY: ICD-10-CM

## 2025-04-04 DIAGNOSIS — D45 POLYCYTHEMIA VERA: ICD-10-CM

## 2025-04-04 LAB
ALBUMIN SERPL ELPH-MCNC: 4.9 G/DL
ALP BLD-CCNC: 146 U/L
ALT SERPL-CCNC: 49 U/L
ANION GAP SERPL CALC-SCNC: 10 MMOL/L
AST SERPL-CCNC: 29 U/L
BASOPHILS # BLD AUTO: 0.07 K/UL — SIGNIFICANT CHANGE UP (ref 0–0.2)
BASOPHILS NFR BLD AUTO: 0.6 % — SIGNIFICANT CHANGE UP (ref 0–2)
BILIRUB SERPL-MCNC: 0.5 MG/DL
BUN SERPL-MCNC: 17 MG/DL
CALCIUM SERPL-MCNC: 10.1 MG/DL
CHLORIDE SERPL-SCNC: 103 MMOL/L
CO2 SERPL-SCNC: 30 MMOL/L
CREAT SERPL-MCNC: 0.94 MG/DL
EGFRCR SERPLBLD CKD-EPI 2021: 99 ML/MIN/1.73M2
EOSINOPHIL # BLD AUTO: 0.19 K/UL — SIGNIFICANT CHANGE UP (ref 0–0.5)
EOSINOPHIL NFR BLD AUTO: 1.5 % — SIGNIFICANT CHANGE UP (ref 0–6)
GLUCOSE SERPL-MCNC: 109 MG/DL
HCT VFR BLD CALC: 47.9 % — SIGNIFICANT CHANGE UP (ref 39–50)
HGB BLD-MCNC: 15.6 G/DL — SIGNIFICANT CHANGE UP (ref 13–17)
IMM GRANULOCYTES NFR BLD AUTO: 0.6 % — SIGNIFICANT CHANGE UP (ref 0–0.9)
LYMPHOCYTES # BLD AUTO: 1.44 K/UL — SIGNIFICANT CHANGE UP (ref 1–3.3)
LYMPHOCYTES # BLD AUTO: 11.5 % — LOW (ref 13–44)
MCHC RBC-ENTMCNC: 30.8 PG — SIGNIFICANT CHANGE UP (ref 27–34)
MCHC RBC-ENTMCNC: 32.6 G/DL — SIGNIFICANT CHANGE UP (ref 32–36)
MCV RBC AUTO: 94.7 FL — SIGNIFICANT CHANGE UP (ref 80–100)
MONOCYTES # BLD AUTO: 0.63 K/UL — SIGNIFICANT CHANGE UP (ref 0–0.9)
MONOCYTES NFR BLD AUTO: 5 % — SIGNIFICANT CHANGE UP (ref 2–14)
NEUTROPHILS # BLD AUTO: 10.13 K/UL — HIGH (ref 1.8–7.4)
NEUTROPHILS NFR BLD AUTO: 80.8 % — HIGH (ref 43–77)
NRBC BLD AUTO-RTO: 0 /100 WBCS — SIGNIFICANT CHANGE UP (ref 0–0)
PLATELET # BLD AUTO: 365 K/UL — SIGNIFICANT CHANGE UP (ref 150–400)
POTASSIUM SERPL-SCNC: 4.7 MMOL/L
PROT SERPL-MCNC: 7.5 G/DL
PROT SERPL-MCNC: 7.6 G/DL
RBC # BLD: 5.06 M/UL — SIGNIFICANT CHANGE UP (ref 4.2–5.8)
RBC # FLD: 13.7 % — SIGNIFICANT CHANGE UP (ref 10.3–14.5)
SODIUM SERPL-SCNC: 143 MMOL/L
WBC # BLD: 12.54 K/UL — HIGH (ref 3.8–10.5)
WBC # FLD AUTO: 12.54 K/UL — HIGH (ref 3.8–10.5)

## 2025-04-04 PROCEDURE — 99215 OFFICE O/P EST HI 40 MIN: CPT

## 2025-04-04 RX ORDER — SODIUM PICOSULFATE, MAGNESIUM OXIDE, AND ANHYDROUS CITRIC ACID 12; 3.5; 1 G/175ML; G/175ML; MG/175ML
10-3.5-12 MG-GM LIQUID ORAL
Qty: 1 | Refills: 0 | Status: DISCONTINUED | COMMUNITY
Start: 2025-03-24 | End: 2025-04-04

## 2025-04-04 NOTE — ED ADULT NURSE NOTE - NS ED NURSE LEVEL OF CONSCIOUSNESS SPEECH
Discharge to Home. Patient's mother at bedside.  Patient given walker by PT. Scripts sent to [facility] per her request.  F/u appt already scheduled.    Speaking Coherently

## 2025-04-05 LAB
DEPRECATED KAPPA LC FREE/LAMBDA SER: 1.48 RATIO
IGA SER QL IEP: 237 MG/DL
IGG SER QL IEP: 1139 MG/DL
IGM SER QL IEP: 175 MG/DL
KAPPA LC CSF-MCNC: 1.33 MG/DL
KAPPA LC SERPL-MCNC: 1.97 MG/DL

## 2025-04-16 ENCOUNTER — APPOINTMENT (OUTPATIENT)
Dept: COLORECTAL SURGERY | Facility: CLINIC | Age: 50
End: 2025-04-16

## 2025-04-18 ENCOUNTER — APPOINTMENT (OUTPATIENT)
Dept: MRI IMAGING | Facility: IMAGING CENTER | Age: 50
End: 2025-04-18

## 2025-04-18 ENCOUNTER — OUTPATIENT (OUTPATIENT)
Dept: OUTPATIENT SERVICES | Facility: HOSPITAL | Age: 50
LOS: 1 days | End: 2025-04-18
Payer: COMMERCIAL

## 2025-04-18 DIAGNOSIS — Z00.8 ENCOUNTER FOR OTHER GENERAL EXAMINATION: ICD-10-CM

## 2025-04-18 DIAGNOSIS — K56.609 UNSPECIFIED INTESTINAL OBSTRUCTION, UNSPECIFIED AS TO PARTIAL VERSUS COMPLETE OBSTRUCTION: ICD-10-CM

## 2025-04-18 PROCEDURE — C8902: CPT

## 2025-04-18 PROCEDURE — A9585: CPT

## 2025-04-18 PROCEDURE — 74185 MRA ABD W OR W/O CNTRST: CPT | Mod: 26

## 2025-04-29 RX ORDER — SODIUM SULFATE, POTASSIUM SULFATE AND MAGNESIUM SULFATE 1.6; 3.13; 17.5 G/177ML; G/177ML; G/177ML
17.5-3.13-1.6 SOLUTION ORAL
Qty: 2 | Refills: 0 | Status: ACTIVE | COMMUNITY
Start: 2025-04-29 | End: 1900-01-01

## 2025-05-01 ENCOUNTER — APPOINTMENT (OUTPATIENT)
Dept: GASTROENTEROLOGY | Facility: AMBULATORY MEDICAL SERVICES | Age: 50
End: 2025-05-01

## 2025-05-01 PROCEDURE — 43239 EGD BIOPSY SINGLE/MULTIPLE: CPT | Mod: 59

## 2025-05-01 PROCEDURE — 45380 COLONOSCOPY AND BIOPSY: CPT | Mod: 33

## 2025-05-10 ENCOUNTER — OUTPATIENT (OUTPATIENT)
Dept: OUTPATIENT SERVICES | Facility: HOSPITAL | Age: 50
LOS: 1 days | Discharge: HOME | End: 2025-05-10

## 2025-05-10 DIAGNOSIS — R79.9 ABNORMAL FINDING OF BLOOD CHEMISTRY, UNSPECIFIED: ICD-10-CM

## 2025-05-14 ENCOUNTER — RESULT REVIEW (OUTPATIENT)
Age: 50
End: 2025-05-14

## 2025-05-14 ENCOUNTER — APPOINTMENT (OUTPATIENT)
Dept: INFUSION THERAPY | Facility: HOSPITAL | Age: 50
End: 2025-05-14

## 2025-05-14 ENCOUNTER — APPOINTMENT (OUTPATIENT)
Dept: HEMATOLOGY ONCOLOGY | Facility: CLINIC | Age: 50
End: 2025-05-14
Payer: COMMERCIAL

## 2025-05-14 VITALS
BODY MASS INDEX: 24.88 KG/M2 | RESPIRATION RATE: 16 BRPM | HEIGHT: 63.82 IN | HEART RATE: 71 BPM | TEMPERATURE: 98.6 F | OXYGEN SATURATION: 98 % | WEIGHT: 143.96 LBS | DIASTOLIC BLOOD PRESSURE: 69 MMHG | SYSTOLIC BLOOD PRESSURE: 105 MMHG

## 2025-05-14 DIAGNOSIS — D47.2 MONOCLONAL GAMMOPATHY: ICD-10-CM

## 2025-05-14 DIAGNOSIS — D45 POLYCYTHEMIA VERA: ICD-10-CM

## 2025-05-14 LAB
BASOPHILS # BLD AUTO: 0.07 K/UL — SIGNIFICANT CHANGE UP (ref 0–0.2)
BASOPHILS NFR BLD AUTO: 0.6 % — SIGNIFICANT CHANGE UP (ref 0–2)
EOSINOPHIL # BLD AUTO: 0.24 K/UL — SIGNIFICANT CHANGE UP (ref 0–0.5)
EOSINOPHIL NFR BLD AUTO: 2.1 % — SIGNIFICANT CHANGE UP (ref 0–6)
HCT VFR BLD CALC: 45.3 % — SIGNIFICANT CHANGE UP (ref 39–50)
HGB BLD-MCNC: 15 G/DL — SIGNIFICANT CHANGE UP (ref 13–17)
IMM GRANULOCYTES NFR BLD AUTO: 0.5 % — SIGNIFICANT CHANGE UP (ref 0–0.9)
LYMPHOCYTES # BLD AUTO: 1.62 K/UL — SIGNIFICANT CHANGE UP (ref 1–3.3)
LYMPHOCYTES # BLD AUTO: 14.3 % — SIGNIFICANT CHANGE UP (ref 13–44)
MCHC RBC-ENTMCNC: 29.1 PG — SIGNIFICANT CHANGE UP (ref 27–34)
MCHC RBC-ENTMCNC: 33.1 G/DL — SIGNIFICANT CHANGE UP (ref 32–36)
MCV RBC AUTO: 88 FL — SIGNIFICANT CHANGE UP (ref 80–100)
MONOCYTES # BLD AUTO: 0.39 K/UL — SIGNIFICANT CHANGE UP (ref 0–0.9)
MONOCYTES NFR BLD AUTO: 3.4 % — SIGNIFICANT CHANGE UP (ref 2–14)
NEUTROPHILS # BLD AUTO: 8.93 K/UL — HIGH (ref 1.8–7.4)
NEUTROPHILS NFR BLD AUTO: 79.1 % — HIGH (ref 43–77)
NRBC BLD AUTO-RTO: 0 /100 WBCS — SIGNIFICANT CHANGE UP (ref 0–0)
PLATELET # BLD AUTO: 353 K/UL — SIGNIFICANT CHANGE UP (ref 150–400)
RBC # BLD: 5.15 M/UL — SIGNIFICANT CHANGE UP (ref 4.2–5.8)
RBC # FLD: 13.2 % — SIGNIFICANT CHANGE UP (ref 10.3–14.5)
WBC # BLD: 11.31 K/UL — HIGH (ref 3.8–10.5)
WBC # FLD AUTO: 11.31 K/UL — HIGH (ref 3.8–10.5)

## 2025-05-14 PROCEDURE — 99214 OFFICE O/P EST MOD 30 MIN: CPT

## 2025-05-14 RX ORDER — ASPIRIN 81 MG/1
81 TABLET, DELAYED RELEASE ORAL DAILY
Refills: 0 | Status: ACTIVE | COMMUNITY
Start: 2025-05-14

## 2025-05-15 LAB
ALBUMIN SERPL ELPH-MCNC: 4.7 G/DL — SIGNIFICANT CHANGE UP (ref 3.3–5)
ALP SERPL-CCNC: 118 U/L — SIGNIFICANT CHANGE UP (ref 40–120)
ALT FLD-CCNC: 28 U/L — SIGNIFICANT CHANGE UP (ref 10–50)
ANION GAP SERPL CALC-SCNC: 20 MMOL/L — HIGH (ref 5–17)
AST SERPL-CCNC: 36 U/L — SIGNIFICANT CHANGE UP (ref 10–40)
BILIRUB SERPL-MCNC: 0.4 MG/DL — SIGNIFICANT CHANGE UP (ref 0.2–1.2)
BUN SERPL-MCNC: 14 MG/DL — SIGNIFICANT CHANGE UP (ref 7–23)
CALCIUM SERPL-MCNC: 9.7 MG/DL — SIGNIFICANT CHANGE UP (ref 8.4–10.5)
CHLORIDE SERPL-SCNC: 99 MMOL/L — SIGNIFICANT CHANGE UP (ref 96–108)
CO2 SERPL-SCNC: 22 MMOL/L — SIGNIFICANT CHANGE UP (ref 22–31)
CREAT SERPL-MCNC: 0.86 MG/DL — SIGNIFICANT CHANGE UP (ref 0.5–1.3)
EGFR: 105 ML/MIN/1.73M2 — SIGNIFICANT CHANGE UP
EGFR: 105 ML/MIN/1.73M2 — SIGNIFICANT CHANGE UP
GLUCOSE SERPL-MCNC: 28 MG/DL — CRITICAL LOW (ref 70–99)
POTASSIUM SERPL-MCNC: 4 MMOL/L — SIGNIFICANT CHANGE UP (ref 3.5–5.3)
POTASSIUM SERPL-SCNC: 4 MMOL/L — SIGNIFICANT CHANGE UP (ref 3.5–5.3)
PROT SERPL-MCNC: 8.3 G/DL — SIGNIFICANT CHANGE UP (ref 6–8.3)
SODIUM SERPL-SCNC: 141 MMOL/L — SIGNIFICANT CHANGE UP (ref 135–145)

## 2025-06-25 ENCOUNTER — APPOINTMENT (OUTPATIENT)
Dept: GASTROENTEROLOGY | Facility: CLINIC | Age: 50
End: 2025-06-25
Payer: COMMERCIAL

## 2025-06-25 VITALS
HEART RATE: 92 BPM | TEMPERATURE: 98.5 F | BODY MASS INDEX: 25.58 KG/M2 | WEIGHT: 148 LBS | DIASTOLIC BLOOD PRESSURE: 76 MMHG | SYSTOLIC BLOOD PRESSURE: 125 MMHG | HEIGHT: 63.82 IN | OXYGEN SATURATION: 97 %

## 2025-06-25 PROCEDURE — 99214 OFFICE O/P EST MOD 30 MIN: CPT

## 2025-06-26 ENCOUNTER — APPOINTMENT (OUTPATIENT)
Dept: INFUSION THERAPY | Facility: HOSPITAL | Age: 50
End: 2025-06-26

## 2025-06-26 ENCOUNTER — RESULT REVIEW (OUTPATIENT)
Age: 50
End: 2025-06-26

## 2025-06-26 ENCOUNTER — APPOINTMENT (OUTPATIENT)
Dept: HEMATOLOGY ONCOLOGY | Facility: CLINIC | Age: 50
End: 2025-06-26

## 2025-06-26 LAB
BASOPHILS # BLD AUTO: 0.08 K/UL — SIGNIFICANT CHANGE UP (ref 0–0.2)
BASOPHILS NFR BLD AUTO: 0.8 % — SIGNIFICANT CHANGE UP (ref 0–2)
EOSINOPHIL # BLD AUTO: 0.25 K/UL — SIGNIFICANT CHANGE UP (ref 0–0.5)
EOSINOPHIL NFR BLD AUTO: 2.4 % — SIGNIFICANT CHANGE UP (ref 0–6)
HCT VFR BLD CALC: 43.8 % — SIGNIFICANT CHANGE UP (ref 39–50)
HGB BLD-MCNC: 13.8 G/DL — SIGNIFICANT CHANGE UP (ref 13–17)
IMM GRANULOCYTES NFR BLD AUTO: 0.5 % — SIGNIFICANT CHANGE UP (ref 0–0.9)
LYMPHOCYTES # BLD AUTO: 1.69 K/UL — SIGNIFICANT CHANGE UP (ref 1–3.3)
LYMPHOCYTES # BLD AUTO: 16 % — SIGNIFICANT CHANGE UP (ref 13–44)
MCHC RBC-ENTMCNC: 26.2 PG — LOW (ref 27–34)
MCHC RBC-ENTMCNC: 31.5 G/DL — LOW (ref 32–36)
MCV RBC AUTO: 83.1 FL — SIGNIFICANT CHANGE UP (ref 80–100)
MONOCYTES # BLD AUTO: 0.52 K/UL — SIGNIFICANT CHANGE UP (ref 0–0.9)
MONOCYTES NFR BLD AUTO: 4.9 % — SIGNIFICANT CHANGE UP (ref 2–14)
NEUTROPHILS # BLD AUTO: 8 K/UL — HIGH (ref 1.8–7.4)
NEUTROPHILS NFR BLD AUTO: 75.4 % — SIGNIFICANT CHANGE UP (ref 43–77)
NRBC BLD AUTO-RTO: 0 /100 WBCS — SIGNIFICANT CHANGE UP (ref 0–0)
PLATELET # BLD AUTO: 453 K/UL — HIGH (ref 150–400)
RBC # BLD: 5.27 M/UL — SIGNIFICANT CHANGE UP (ref 4.2–5.8)
RBC # FLD: 14.8 % — HIGH (ref 10.3–14.5)
WBC # BLD: 10.59 K/UL — HIGH (ref 3.8–10.5)
WBC # FLD AUTO: 10.59 K/UL — HIGH (ref 3.8–10.5)

## 2025-07-21 ENCOUNTER — OUTPATIENT (OUTPATIENT)
Dept: OUTPATIENT SERVICES | Facility: HOSPITAL | Age: 50
LOS: 1 days | Discharge: HOME | End: 2025-07-21

## 2025-07-21 DIAGNOSIS — R79.9 ABNORMAL FINDING OF BLOOD CHEMISTRY, UNSPECIFIED: ICD-10-CM

## 2025-07-24 ENCOUNTER — APPOINTMENT (OUTPATIENT)
Dept: INFUSION THERAPY | Facility: HOSPITAL | Age: 50
End: 2025-07-24

## 2025-07-24 ENCOUNTER — APPOINTMENT (OUTPATIENT)
Dept: HEMATOLOGY ONCOLOGY | Facility: CLINIC | Age: 50
End: 2025-07-24

## 2025-07-24 ENCOUNTER — RESULT REVIEW (OUTPATIENT)
Age: 50
End: 2025-07-24

## 2025-07-24 LAB
BASOPHILS # BLD AUTO: 0.12 K/UL — SIGNIFICANT CHANGE UP (ref 0–0.2)
BASOPHILS NFR BLD AUTO: 1 % — SIGNIFICANT CHANGE UP (ref 0–2)
EOSINOPHIL # BLD AUTO: 0.3 K/UL — SIGNIFICANT CHANGE UP (ref 0–0.5)
EOSINOPHIL NFR BLD AUTO: 2.4 % — SIGNIFICANT CHANGE UP (ref 0–6)
HCT VFR BLD CALC: 45.9 % — SIGNIFICANT CHANGE UP (ref 39–50)
HGB BLD-MCNC: 14.5 G/DL — SIGNIFICANT CHANGE UP (ref 13–17)
IMM GRANULOCYTES NFR BLD AUTO: 0.5 % — SIGNIFICANT CHANGE UP (ref 0–0.9)
LYMPHOCYTES # BLD AUTO: 1.54 K/UL — SIGNIFICANT CHANGE UP (ref 1–3.3)
LYMPHOCYTES # BLD AUTO: 12.4 % — LOW (ref 13–44)
MCHC RBC-ENTMCNC: 25.6 PG — LOW (ref 27–34)
MCHC RBC-ENTMCNC: 31.6 G/DL — LOW (ref 32–36)
MCV RBC AUTO: 81.1 FL — SIGNIFICANT CHANGE UP (ref 80–100)
MONOCYTES # BLD AUTO: 0.72 K/UL — SIGNIFICANT CHANGE UP (ref 0–0.9)
MONOCYTES NFR BLD AUTO: 5.8 % — SIGNIFICANT CHANGE UP (ref 2–14)
NEUTROPHILS # BLD AUTO: 9.71 K/UL — HIGH (ref 1.8–7.4)
NEUTROPHILS NFR BLD AUTO: 77.9 % — HIGH (ref 43–77)
PLATELET # BLD AUTO: 394 K/UL — SIGNIFICANT CHANGE UP (ref 150–400)
RBC # BLD: 5.66 M/UL — SIGNIFICANT CHANGE UP (ref 4.2–5.8)
RBC # FLD: 15.6 % — HIGH (ref 10.3–14.5)
WBC # BLD: 12.45 K/UL — HIGH (ref 3.8–10.5)
WBC # FLD AUTO: 12.45 K/UL — HIGH (ref 3.8–10.5)

## 2025-08-21 ENCOUNTER — APPOINTMENT (OUTPATIENT)
Dept: HEMATOLOGY ONCOLOGY | Facility: CLINIC | Age: 50
End: 2025-08-21

## 2025-08-21 ENCOUNTER — APPOINTMENT (OUTPATIENT)
Dept: INFUSION THERAPY | Facility: HOSPITAL | Age: 50
End: 2025-08-21

## 2025-08-21 ENCOUNTER — RESULT REVIEW (OUTPATIENT)
Age: 50
End: 2025-08-21

## 2025-08-21 LAB
BASOPHILS # BLD AUTO: 0.06 K/UL — SIGNIFICANT CHANGE UP (ref 0–0.2)
BASOPHILS NFR BLD AUTO: 0.5 % — SIGNIFICANT CHANGE UP (ref 0–2)
EOSINOPHIL # BLD AUTO: 0.4 K/UL — SIGNIFICANT CHANGE UP (ref 0–0.5)
EOSINOPHIL NFR BLD AUTO: 3.3 % — SIGNIFICANT CHANGE UP (ref 0–6)
HCT VFR BLD CALC: 49.8 % — SIGNIFICANT CHANGE UP (ref 39–50)
HGB BLD-MCNC: 15.5 G/DL — SIGNIFICANT CHANGE UP (ref 13–17)
IMM GRANULOCYTES NFR BLD AUTO: 0.6 % — SIGNIFICANT CHANGE UP (ref 0–0.9)
LYMPHOCYTES # BLD AUTO: 1.8 K/UL — SIGNIFICANT CHANGE UP (ref 1–3.3)
LYMPHOCYTES # BLD AUTO: 14.8 % — SIGNIFICANT CHANGE UP (ref 13–44)
MCHC RBC-ENTMCNC: 24.6 PG — LOW (ref 27–34)
MCHC RBC-ENTMCNC: 31.1 G/DL — LOW (ref 32–36)
MCV RBC AUTO: 79 FL — LOW (ref 80–100)
MONOCYTES # BLD AUTO: 0.46 K/UL — SIGNIFICANT CHANGE UP (ref 0–0.9)
MONOCYTES NFR BLD AUTO: 3.8 % — SIGNIFICANT CHANGE UP (ref 2–14)
NEUTROPHILS # BLD AUTO: 9.38 K/UL — HIGH (ref 1.8–7.4)
NEUTROPHILS NFR BLD AUTO: 77 % — SIGNIFICANT CHANGE UP (ref 43–77)
NRBC BLD AUTO-RTO: 0 /100 WBCS — SIGNIFICANT CHANGE UP (ref 0–0)
PLATELET # BLD AUTO: 427 K/UL — HIGH (ref 150–400)
RBC # BLD: 6.3 M/UL — HIGH (ref 4.2–5.8)
RBC # FLD: 17.3 % — HIGH (ref 10.3–14.5)
WBC # BLD: 12.17 K/UL — HIGH (ref 3.8–10.5)
WBC # FLD AUTO: 12.17 K/UL — HIGH (ref 3.8–10.5)

## 2025-08-27 DIAGNOSIS — D45 POLYCYTHEMIA VERA: ICD-10-CM

## 2025-09-09 ENCOUNTER — RESULT REVIEW (OUTPATIENT)
Age: 50
End: 2025-09-09

## 2025-09-09 ENCOUNTER — APPOINTMENT (OUTPATIENT)
Dept: HEMATOLOGY ONCOLOGY | Facility: CLINIC | Age: 50
End: 2025-09-09
Payer: COMMERCIAL

## 2025-09-09 ENCOUNTER — LABORATORY RESULT (OUTPATIENT)
Age: 50
End: 2025-09-09

## 2025-09-09 VITALS
RESPIRATION RATE: 15 BRPM | BODY MASS INDEX: 26.03 KG/M2 | DIASTOLIC BLOOD PRESSURE: 75 MMHG | TEMPERATURE: 97.3 F | HEART RATE: 73 BPM | SYSTOLIC BLOOD PRESSURE: 120 MMHG | OXYGEN SATURATION: 99 % | WEIGHT: 150.8 LBS

## 2025-09-09 DIAGNOSIS — D45 POLYCYTHEMIA VERA: ICD-10-CM

## 2025-09-09 DIAGNOSIS — D47.2 MONOCLONAL GAMMOPATHY: ICD-10-CM

## 2025-09-09 LAB
BASOPHILS # BLD AUTO: 0.08 K/UL — SIGNIFICANT CHANGE UP (ref 0–0.2)
BASOPHILS NFR BLD AUTO: 0.6 % — SIGNIFICANT CHANGE UP (ref 0–2)
EOSINOPHIL # BLD AUTO: 0.55 K/UL — HIGH (ref 0–0.5)
EOSINOPHIL NFR BLD AUTO: 4.2 % — SIGNIFICANT CHANGE UP (ref 0–6)
HCT VFR BLD CALC: 46.1 % — SIGNIFICANT CHANGE UP (ref 39–50)
HGB BLD-MCNC: 14.4 G/DL — SIGNIFICANT CHANGE UP (ref 13–17)
IMM GRANULOCYTES NFR BLD AUTO: 0.8 % — SIGNIFICANT CHANGE UP (ref 0–0.9)
LYMPHOCYTES # BLD AUTO: 1.54 K/UL — SIGNIFICANT CHANGE UP (ref 1–3.3)
LYMPHOCYTES # BLD AUTO: 11.7 % — LOW (ref 13–44)
MCHC RBC-ENTMCNC: 24.5 PG — LOW (ref 27–34)
MCHC RBC-ENTMCNC: 31.2 G/DL — LOW (ref 32–36)
MCV RBC AUTO: 78.4 FL — LOW (ref 80–100)
MONOCYTES # BLD AUTO: 0.42 K/UL — SIGNIFICANT CHANGE UP (ref 0–0.9)
MONOCYTES NFR BLD AUTO: 3.2 % — SIGNIFICANT CHANGE UP (ref 2–14)
NEUTROPHILS # BLD AUTO: 10.47 K/UL — HIGH (ref 1.8–7.4)
NEUTROPHILS NFR BLD AUTO: 79.5 % — HIGH (ref 43–77)
NRBC BLD AUTO-RTO: 0 /100 WBCS — SIGNIFICANT CHANGE UP (ref 0–0)
PLATELET # BLD AUTO: 388 K/UL — SIGNIFICANT CHANGE UP (ref 150–400)
RBC # BLD: 5.88 M/UL — HIGH (ref 4.2–5.8)
RBC # FLD: 16.6 % — HIGH (ref 10.3–14.5)
WBC # BLD: 13.16 K/UL — HIGH (ref 3.8–10.5)
WBC # FLD AUTO: 13.16 K/UL — HIGH (ref 3.8–10.5)

## 2025-09-09 PROCEDURE — 99213 OFFICE O/P EST LOW 20 MIN: CPT

## 2025-09-11 LAB
ALBUMIN SERPL ELPH-MCNC: 4.7 G/DL
ALP BLD-CCNC: 114 U/L
ALT SERPL-CCNC: 31 U/L
ANION GAP SERPL CALC-SCNC: 14 MMOL/L
AST SERPL-CCNC: 29 U/L
BILIRUB SERPL-MCNC: 0.4 MG/DL
BUN SERPL-MCNC: 14 MG/DL
CALCIUM SERPL-MCNC: 9.5 MG/DL
CHLORIDE SERPL-SCNC: 105 MMOL/L
CO2 SERPL-SCNC: 24 MMOL/L
CREAT SERPL-MCNC: 0.96 MG/DL
DEPRECATED KAPPA LC FREE/LAMBDA SER: 1.5 RATIO
EGFRCR SERPLBLD CKD-EPI 2021: 96 ML/MIN/1.73M2
GLUCOSE SERPL-MCNC: 78 MG/DL
IGA SERPL-MCNC: 259 MG/DL
IGG SERPL-MCNC: 1368 MG/DL
IGM SERPL-MCNC: 184 MG/DL
KAPPA LC CSF-MCNC: 1.45 MG/DL
KAPPA LC SERPL-MCNC: 2.17 MG/DL
LDH SERPL-CCNC: 223 U/L
POTASSIUM SERPL-SCNC: 5.1 MMOL/L
PROT SERPL-MCNC: 7.9 G/DL
SODIUM SERPL-SCNC: 142 MMOL/L

## 2025-09-15 LAB
ALBUMIN MFR SERPL ELPH: 59.4 %
ALBUMIN SERPL-MCNC: 4.6 G/DL
ALBUMIN/GLOB SERPL: 1.5 RATIO
ALPHA1 GLOB MFR SERPL ELPH: 3.3 %
ALPHA1 GLOB SERPL ELPH-MCNC: 0.3 G/DL
ALPHA2 GLOB MFR SERPL ELPH: 7.3 %
ALPHA2 GLOB SERPL ELPH-MCNC: 0.6 G/DL
B-GLOBULIN MFR SERPL ELPH: 17.4 %
B-GLOBULIN SERPL ELPH-MCNC: 1.3 G/DL
GAMMA GLOB FLD ELPH-MCNC: 1 G/DL
GAMMA GLOB MFR SERPL ELPH: 12.6 %
INTERPRETATION SERPL IEP-IMP: NORMAL
M PROTEIN MFR SERPL ELPH: 3.4 %
MONOCLON BAND OBS SERPL: 0.3 G/DL
PROT SERPL-MCNC: 7.7 G/DL
PROT SERPL-MCNC: 7.7 G/DL

## (undated) DEVICE — SPECIMEN CONTAINER 100ML

## (undated) DEVICE — PREP CHLORAPREP HI-LITE ORANGE 26ML

## (undated) DEVICE — GLV 7.5 PROTEXIS (WHITE)

## (undated) DEVICE — DRAPE 1/2 SHEET 40X57"

## (undated) DEVICE — SOL IRR POUR NS 0.9% 500ML

## (undated) DEVICE — TROCAR APPLIED MEDICAL KII BALLOON BLUNT TIP 12MM X 100MM

## (undated) DEVICE — SHEARS COVIDIEN ENDO SHEAR 5MM X 31CM W UNIPOLAR CAUTERY

## (undated) DEVICE — ELCTR BOVIE PENCIL SMOKE EVACUATION

## (undated) DEVICE — POSITIONER FOAM EGG CRATE ULNAR 2PCS (PINK)

## (undated) DEVICE — SUT BIOSYN 4-0 18" P-12

## (undated) DEVICE — SOL IRR POUR H2O 250ML

## (undated) DEVICE — FOLEY TRAY 16FR 5CC LTX UMETER CLOSED

## (undated) DEVICE — DRAPE TOWEL BLUE 17" X 24"

## (undated) DEVICE — LIGASURE MARYLAND 5MM X 37CM

## (undated) DEVICE — TROCAR COVIDIEN VERSAPORT BLADELESS OPTICAL 5MM STANDARD

## (undated) DEVICE — GLV 8.5 PROTEXIS (WHITE)

## (undated) DEVICE — INSUFFLATION NDL COVIDIEN STEP 14G FOR STEP/VERSASTEP

## (undated) DEVICE — D HELP - CLEARVIEW CLEARIFY SYSTEM

## (undated) DEVICE — WARMING BLANKET UPPER ADULT

## (undated) DEVICE — VENODYNE/SCD SLEEVE CALF MEDIUM

## (undated) DEVICE — ENDOCATCH 10MM

## (undated) DEVICE — PACK LAP CHOLE LAP APPENDECTOMY

## (undated) DEVICE — GOWN TRIMAX LG

## (undated) DEVICE — MARKING PEN W RULER

## (undated) DEVICE — TUBING INSUFFLATION LAP FILTER 10FT

## (undated) DEVICE — GLV 6.5 PROTEXIS (WHITE)

## (undated) DEVICE — GLV 8 PROTEXIS (WHITE)

## (undated) DEVICE — LIGASURE BLUNT TIP 5MM X 37CM

## (undated) DEVICE — GLV 7 PROTEXIS (WHITE)

## (undated) DEVICE — DRSG OPSITE 13.75 X 4"

## (undated) DEVICE — TUBING STRYKEFLOW II SUCTION / IRRIGATOR

## (undated) DEVICE — SUT POLYSORB 0 36" GU-46

## (undated) DEVICE — MEDICATION LABELS W MARKER

## (undated) DEVICE — DRSG STERISTRIPS 0.5 X 4"